# Patient Record
Sex: FEMALE | Race: WHITE | Employment: OTHER | URBAN - METROPOLITAN AREA
[De-identification: names, ages, dates, MRNs, and addresses within clinical notes are randomized per-mention and may not be internally consistent; named-entity substitution may affect disease eponyms.]

---

## 2018-12-17 ENCOUNTER — HOSPITAL ENCOUNTER (OUTPATIENT)
Age: 83
Discharge: HOME | End: 2018-12-17
Payer: MEDICARE

## 2018-12-17 ENCOUNTER — HOSPITAL ENCOUNTER (OUTPATIENT)
Age: 83
Discharge: HOME | End: 2018-12-19
Payer: MEDICARE

## 2018-12-17 LAB
ABO/RH: NORMAL
ANION GAP SERPL CALCULATED.3IONS-SCNC: 13 MMOL/L (ref 7–16)
ANTIBODY SCREEN: NORMAL
APTT: 39 SEC (ref 24.5–35.1)
BILIRUBIN URINE: NEGATIVE
BLOOD, URINE: NEGATIVE
BUN BLDV-MCNC: 16 MG/DL (ref 8–23)
CALCIUM SERPL-MCNC: 9.6 MG/DL (ref 8.6–10.2)
CHLORIDE BLD-SCNC: 101 MMOL/L (ref 98–107)
CLARITY: CLEAR
CO2: 26 MMOL/L (ref 22–29)
COLOR: YELLOW
CREAT SERPL-MCNC: 1.1 MG/DL (ref 0.5–1)
GFR AFRICAN AMERICAN: 57
GFR NON-AFRICAN AMERICAN: 47 ML/MIN/1.73
GLUCOSE BLD-MCNC: 103 MG/DL (ref 74–99)
GLUCOSE URINE: NEGATIVE MG/DL
HCT VFR BLD CALC: 40.5 % (ref 34–48)
HEMOGLOBIN: 12.7 G/DL (ref 11.5–15.5)
INR BLD: 1.2
KETONES, URINE: NEGATIVE MG/DL
LEUKOCYTE ESTERASE, URINE: NEGATIVE
MCH RBC QN AUTO: 27.9 PG (ref 26–35)
MCHC RBC AUTO-ENTMCNC: 31.4 % (ref 32–34.5)
MCV RBC AUTO: 89 FL (ref 80–99.9)
NITRITE, URINE: NEGATIVE
PDW BLD-RTO: 14 FL (ref 11.5–15)
PH UA: 7 (ref 5–9)
PLATELET # BLD: 233 E9/L (ref 130–450)
PMV BLD AUTO: 11.7 FL (ref 7–12)
POTASSIUM SERPL-SCNC: 4.7 MMOL/L (ref 3.5–5)
PROTEIN UA: NEGATIVE MG/DL
PROTHROMBIN TIME: 13.5 SEC (ref 9.3–12.4)
RBC # BLD: 4.55 E12/L (ref 3.5–5.5)
SODIUM BLD-SCNC: 140 MMOL/L (ref 132–146)
SPECIFIC GRAVITY UA: <=1.005 (ref 1–1.03)
UROBILINOGEN, URINE: 0.2 E.U./DL
WBC # BLD: 5.5 E9/L (ref 4.5–11.5)

## 2018-12-17 PROCEDURE — 85730 THROMBOPLASTIN TIME PARTIAL: CPT

## 2018-12-17 PROCEDURE — 85610 PROTHROMBIN TIME: CPT

## 2018-12-17 PROCEDURE — 85027 COMPLETE CBC AUTOMATED: CPT

## 2018-12-17 PROCEDURE — 86850 RBC ANTIBODY SCREEN: CPT

## 2018-12-17 PROCEDURE — 81003 URINALYSIS AUTO W/O SCOPE: CPT

## 2018-12-17 PROCEDURE — 80048 BASIC METABOLIC PNL TOTAL CA: CPT

## 2018-12-17 PROCEDURE — 36415 COLL VENOUS BLD VENIPUNCTURE: CPT

## 2018-12-17 PROCEDURE — 86901 BLOOD TYPING SEROLOGIC RH(D): CPT

## 2018-12-17 PROCEDURE — 86900 BLOOD TYPING SEROLOGIC ABO: CPT

## 2018-12-19 ENCOUNTER — HOSPITAL ENCOUNTER (OUTPATIENT)
Dept: CARDIAC CATH/INVASIVE PROCEDURES | Age: 83
Discharge: HOME | End: 2018-12-19
Attending: INTERNAL MEDICINE | Admitting: INTERNAL MEDICINE
Payer: MEDICARE

## 2018-12-19 VITALS
RESPIRATION RATE: 19 BRPM | HEIGHT: 63 IN | WEIGHT: 189 LBS | TEMPERATURE: 97.9 F | SYSTOLIC BLOOD PRESSURE: 154 MMHG | HEART RATE: 73 BPM | BODY MASS INDEX: 33.49 KG/M2 | OXYGEN SATURATION: 96 % | DIASTOLIC BLOOD PRESSURE: 82 MMHG

## 2018-12-19 DIAGNOSIS — R07.9 CHEST PAIN, UNSPECIFIED TYPE: ICD-10-CM

## 2018-12-19 PROCEDURE — 6360000002 HC RX W HCPCS

## 2018-12-19 PROCEDURE — 2500000003 HC RX 250 WO HCPCS

## 2018-12-19 PROCEDURE — C1894 INTRO/SHEATH, NON-LASER: HCPCS

## 2018-12-19 PROCEDURE — C1760 CLOSURE DEV, VASC: HCPCS

## 2018-12-19 PROCEDURE — C1769 GUIDE WIRE: HCPCS

## 2018-12-19 PROCEDURE — 2709999900 HC NON-CHARGEABLE SUPPLY

## 2018-12-19 PROCEDURE — 2580000003 HC RX 258: Performed by: INTERNAL MEDICINE

## 2018-12-19 PROCEDURE — 93454 CORONARY ARTERY ANGIO S&I: CPT | Performed by: INTERNAL MEDICINE

## 2018-12-19 RX ORDER — ACETAMINOPHEN 325 MG/1
650 TABLET ORAL EVERY 4 HOURS PRN
Status: CANCELLED | OUTPATIENT
Start: 2018-12-19

## 2018-12-19 RX ORDER — SODIUM CHLORIDE 0.9 % (FLUSH) 0.9 %
10 SYRINGE (ML) INJECTION PRN
Status: CANCELLED | OUTPATIENT
Start: 2018-12-19

## 2018-12-19 RX ORDER — SODIUM CHLORIDE 9 MG/ML
INJECTION, SOLUTION INTRAVENOUS ONCE
Status: COMPLETED | OUTPATIENT
Start: 2018-12-19 | End: 2018-12-19

## 2018-12-19 RX ORDER — APIXABAN 5 MG/1
5 TABLET, FILM COATED ORAL 2 TIMES DAILY
Qty: 60 TABLET | Refills: 1 | Status: SHIPPED | OUTPATIENT
Start: 2018-12-22 | End: 2020-02-26 | Stop reason: SDUPTHER

## 2018-12-19 RX ORDER — SODIUM CHLORIDE 0.9 % (FLUSH) 0.9 %
10 SYRINGE (ML) INJECTION EVERY 12 HOURS SCHEDULED
Status: CANCELLED | OUTPATIENT
Start: 2018-12-19

## 2018-12-19 RX ORDER — LANOLIN ALCOHOL/MO/W.PET/CERES
3 CREAM (GRAM) TOPICAL NIGHTLY PRN
COMMUNITY
End: 2021-04-19

## 2018-12-19 RX ADMIN — SODIUM CHLORIDE: 9 INJECTION, SOLUTION INTRAVENOUS at 12:50

## 2018-12-19 RX ADMIN — SODIUM CHLORIDE: 9 INJECTION, SOLUTION INTRAVENOUS at 12:55

## 2018-12-19 ASSESSMENT — PAIN SCALES - GENERAL
PAINLEVEL_OUTOF10: 0
PAINLEVEL_OUTOF10: 0

## 2018-12-19 NOTE — H&P
Department of Medicine  Section of Cardiology  Attending Procedure History and Physical        Pre-Procedural Diagnosis:  Dyspnea on exertion, abnormal stress test    Indications:  Dyspnea on exertion, abnormal stress test    Procedure Planned:  Cardiac catheterization    History obtained from patient    History of Present Illness: The patient is a 80 y.o. female who presents for the above procedure. The patient is an 80years old female has been having dyspnea on exertion, and abdomen stress test showing inferior lateral ischemia, patient was brought to the Cath Lab to evaluate the anatomy the coronary arteries.     Past Medical History:        Diagnosis Date    SONY (acute kidney injury) ([address] 11/26/2016    Arthritis     back    Atrial fibrillation (HCC)     CKD (chronic kidney disease) stage 3, GFR 30-59 ml/min 11/26/2016    History of blood transfusion     Hypertension     Murmur, heart     innocent  no symptoms    PONV (postoperative nausea and vomiting)     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 (HCC)     Thyroid disease        Past Surgical History:        Procedure Laterality Date    APPENDECTOMY      BACK SURGERY      lumbar fusion    CATARACT REMOVAL WITH IMPLANT  09-25-12    right eye    CATARACT REMOVAL WITH IMPLANT Left 09/17/13    CHOLECYSTECTOMY      COLONOSCOPY      FOOT SURGERY      bilateral    HYSTERECTOMY      OTHER SURGICAL HISTORY Right 10/07/2015    rijt total knee arthroplasy    TOTAL KNEE ARTHROPLASTY Left February 23, 2015     Medications:    Current Outpatient Rx   Medication Sig Dispense Refill    melatonin 3 MG TABS tablet Take 3 mg by mouth nightly as needed      [START ON 12/22/2018] ELIQUIS 5 MG TABS tablet Take 1 tablet by mouth 2 times daily 60 tablet 1    HYDROcodone-acetaminophen (NORCO)  MG per tablet Take 1 tablet by mouth every 6 hours as needed for Pain 60 tablet 0    magnesium oxide (MAG-OX) 400 (240 MG) MG tablet Take 0.5 tablets by mouth daily 30 tablet     levothyroxine (SYNTHROID) 88 MCG tablet Take 1 tablet by mouth Daily 30 tablet 3    metoprolol succinate (TOPROL XL) 25 MG extended release tablet Take 1 tablet by mouth daily 30 tablet 3    ferrous sulfate 325 (65 FE) MG tablet Take 325 mg by mouth daily (with breakfast)      acetaminophen 650 MG TABS Take 650 mg by mouth every 4 hours as needed for Fever (For mild pain or temperature above 102 degrees Fahrenheit) 120 tablet 3    Carboxymethylcellulose Sodium (REFRESH TEARS) 0.5 % SOLN Apply 1 drop to eye daily as needed (for dry eye).  Multiple Vitamins-Minerals (THERAPEUTIC MULTIVITAMIN-MINERALS) tablet Take 1 tablet by mouth daily.  memantine (NAMENDA) 5 MG tablet Take 1 tablet by mouth daily 60 tablet 3    atorvastatin (LIPITOR) 40 MG tablet Take 1 tablet by mouth nightly 30 tablet 3    fluticasone (FLONASE) 50 MCG/ACT nasal spray 2 sprays by Nasal route daily. 0    Polyethylene Glycol 3350 (MIRALAX PO) Take  by mouth daily as needed. Allergies:  Penicillins; Codeine; Cytotec [misoprostol];  Entex [ami-janet]; and Penicillin g    History of allergic reaction to anesthesia:  no    Social History  No smoking    REVIEW OF SYSTEMS  ROS:  CONSTITUTIONAL:  negative for  fevers, chills  HEENT:  negative for earaches, nasal congestion and epistaxis  RESPIRATORY:  negative for  dry cough, cough with sputum,wheezing and hemoptysis  GASTROINTESTINAL:  negative for nausea, vomiting  MUSCULOSKELETAL:  negative for  myalgias, arthralgias  NEUROLOGICAL:  negative for visual disturbance, dysphagia    PHYSICAL EXAM    BP (!) 142/105   Pulse 79   Temp 96.2 °F (35.7 °C) (Temporal)   Resp 20   Ht 5' 3\" (1.6 m)   Wt 189 lb (85.7 kg)   SpO2 96%   BMI 33.48 kg/m²   CONSTITUTIONAL:  awake, alert, cooperative, no apparent distress, and appears stated age  HEAD:  normocepalic, without obvious abnormality, atraumatic  NECK:  Supple, symmetrical, trachea

## 2018-12-19 NOTE — OP NOTE
Indication:  1. Dyspnea on exertion, abnormal stress test  2. AUC score 7  3. AUC indication :  4    Procedure: coronary angiography  Anesthesia: Versed, Fentanyl  Time sedation was administered: 1621. I was present in the room when sedation was administered. Procedure end time: 3341  Time spent with face to face monitoring of moderate sedation: 23 minutes      Findings:  Left main: 0 % stenosis  LAD: 30-40% proximal stenosis, mild ectasia in the mid segment, followed by 40-50% focal disease  Circumflex: luminal irregularities  RCA: Dominant. Luminal irregularities    Hemodynamics:   Ao: 694/99 mmHg      Complication: None   Blood loss: Minimal  Contrast used: 75 mL  Total fluoroscopic time 2.1 minutes    Post Op diagnosis:  40-50% LAD disease    PLAN:  Medical therapy

## 2018-12-20 NOTE — PROCEDURES
It is ectatic in the mid  segment. There is 40% to 50% disease distal to the second diagonal  branch. There is another 30% to 40% disease prior to that ectatic area. The rest of the vessel has luminal irregularities. The left circumflex artery is a small to mid-sized vessel without any  significant disease. The right coronary artery arises normally from the right sinus of  Valsalva. It is a large vessel, dominant circulation without any  significant disease. At the end of the procedure, the catheter and the wire were pulled out  from the body. Limited right iliofemoral angiography confirmed the  arteriotomy was in the right common femoral artery. So, a 6-Hungarian  Angio-Seal was deployed in the right common femoral artery with  effective hemostasis. COMPLICATIONS:  None. MEDICATIONS USED DURING THE PROCEDURE:  Versed and fentanyl for  conscious sedation. First dose was given at [phone]. Procedure ended at  [phone]. There were 23 minutes of direct face-to-face supervision during  conscious sedation administration. Total contrast used was 75 mL. Total fluoroscopy time was 1.7 minutes. IMPRESSION:  1.  50% disease involving the mid segment of the LAD, 40% involving the  proximal segment, mildly ectatic proximal to mid segment of the LAD. Mild disease involving the first diagonal branch. 2.  Luminal irregularities involving the left circumflex artery. 3.  Luminal irregularities involving a very large dominant right  coronary artery. RECOMMENDATIONS:  1. Continue aggressive coronary artery disease risk factor  modifications. 2.  The patient will be observed for four hours, then discharged home if  she meets discharge criteria.         Shena Barakat MD    D: 12/19/2018 17:16:19       T: 12/19/2018 18:47:50     [email or web address]  Job#: [id number]     Doc#: [id number]    CC:  Amaris Cottrell MD

## 2019-03-18 ENCOUNTER — PREP FOR PROCEDURE (OUTPATIENT)
Dept: PAIN MANAGEMENT | Age: 84
End: 2019-03-18

## 2020-02-11 VITALS
SYSTOLIC BLOOD PRESSURE: 148 MMHG | DIASTOLIC BLOOD PRESSURE: 68 MMHG | BODY MASS INDEX: 34.09 KG/M2 | WEIGHT: 192.38 LBS | HEIGHT: 63 IN | HEART RATE: 68 BPM

## 2020-02-11 RX ORDER — VALSARTAN 40 MG/1
40 TABLET ORAL DAILY
COMMUNITY
End: 2020-02-26 | Stop reason: SDUPTHER

## 2020-02-11 RX ORDER — CARVEDILOL 12.5 MG/1
12.5 TABLET ORAL EVERY MORNING
COMMUNITY
End: 2020-09-08 | Stop reason: SDUPTHER

## 2020-02-26 RX ORDER — VALSARTAN 40 MG/1
40 TABLET ORAL DAILY
Qty: 90 TABLET | Refills: 1 | Status: SHIPPED
Start: 2020-02-26 | End: 2020-02-27 | Stop reason: SDUPTHER

## 2020-02-26 RX ORDER — APIXABAN 5 MG/1
5 TABLET, FILM COATED ORAL 2 TIMES DAILY
Qty: 180 TABLET | Refills: 1 | Status: SHIPPED
Start: 2020-02-26 | End: 2020-10-09 | Stop reason: SDUPTHER

## 2020-02-27 RX ORDER — VALSARTAN 40 MG/1
TABLET ORAL
Qty: 180 TABLET | Refills: 1 | Status: SHIPPED
Start: 2020-02-27 | End: 2020-10-09

## 2020-04-14 ENCOUNTER — TELEPHONE (OUTPATIENT)
Dept: CARDIOLOGY CLINIC | Age: 85
End: 2020-04-14

## 2020-08-05 ENCOUNTER — TELEPHONE (OUTPATIENT)
Dept: ADMINISTRATIVE | Age: 85
End: 2020-08-05

## 2020-08-05 NOTE — TELEPHONE ENCOUNTER
Pt called to schedule appt w/ Dr Bob Church, 's schedule is blocked, please call pt at [phone] to schedule her

## 2020-09-08 RX ORDER — CARVEDILOL 12.5 MG/1
12.5 TABLET ORAL EVERY MORNING
Qty: 60 TABLET | Refills: 5 | Status: SHIPPED
Start: 2020-09-08 | End: 2020-10-09 | Stop reason: DRUGHIGH

## 2020-09-08 RX ORDER — CARVEDILOL 12.5 MG/1
12.5 TABLET ORAL EVERY MORNING
Qty: 30 TABLET | Refills: 1 | Status: SHIPPED | OUTPATIENT
Start: 2020-09-08 | End: 2020-09-08

## 2020-10-08 NOTE — PROGRESS NOTES
[facility] Cardiology Progress Note  Dr. Cintia Marques      Referring Physician: Julián Linda MD  CHIEF COMPLAINT:   Chief Complaint   Patient presents with    Atrial Fibrillation     6month check . SOB on exertion. Patient denies any cp or dizziness. HISTORY OF PRESENT ILLNESS:   Patient is 80year old lady with CAD, atrial fibrillation, right MCA stroke, is here for follow up . Shortness of breath is at baseline, Patient denies any lightheadedness, no dizziness, no palpitations, no pedal edema, no PND, no orthopnea, no syncope, no presyncopal episodes.   Functional capacity is at baseline    Past Medical History:   Diagnosis Date    S[address]Y (acute kidney injury) ([facility] [address] 11/26/2016    Arthritis     back    Atrial fibrillation (HCC)     Chronic atrial fibrillation (HCC)     CKD (chronic kidney disease) stage 3, GFR 30-59 ml/min 11/26/2016    History of blood transfusion     Hypertension     Hypothyroidism, unspecified     Long term (current) use of anticoagulants     Murmur, heart     innocent  no symptoms    PONV (postoperative nausea and vomiting)     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 (HCC)     Thyroid disease          Past Surgical History:   Procedure Laterality Date    APPENDECTOMY      BACK SURGERY      lumbar fusion    CATARACT REMOVAL WITH IMPLANT  09-25-12    right eye    CATARACT REMOVAL WITH IMPLANT Left 09/17/13    CHOLECYSTECTOMY      COLONOSCOPY      FOOT SURGERY      bilateral    HYSTERECTOMY      [facility]    TOTAL KNEE ARTHROPLASTY Left February 23, 2015         Current Outpatient Medications   Medication Sig Dispense Refill    carvedilol (COREG) 6.25 MG tablet Take 1 tablet by mouth 2 times daily 180 tablet 2    levothyroxine (SYNTHROID) 88 MCG tablet Take 1 tablet by mouth Daily 30 tablet 3    spironolactone (ALDACTONE) 50 MG tablet Take 1 tablet by mouth daily 90 tablet 1    melatonin 3 MG TABS tablet Take 3 mg by mouth nightly as needed      magnesium oxide (MAG-OX) 400 (240 MG) MG tablet Take 0.5 tablets by mouth daily 30 tablet     acetaminophen 650 MG TABS Take 650 mg by mouth every 4 hours as needed for Fever (For mild pain or temperature above 102 degrees Fahrenheit) 120 tablet 3    Carboxymethylcellulose Sodium (REFRESH TEARS) 0.5 % SOLN Apply 1 drop to eye daily as needed (for dry eye).  Multiple Vitamins-Minerals (THERAPEUTIC MULTIVITAMIN-MINERALS) tablet Take 1 tablet by mouth daily.  Polyethylene Glycol 3350 (MIRALAX PO) Take  by mouth daily as needed.  ELIQUIS 5 MG TABS tablet TAKE 1 TABLET BY MOUTH TWO  TIMES DAILY 180 tablet 3     No current facility-administered medications for this visit. Allergies as of 10/09/2020 - Review Complete 10/09/2020   Allergen Reaction Noted    Cephalexin hcl [cephalexin] Other (See Comments) 02/11/2020    Penicillins  06/08/2012    Codeine Itching 06/08/2012    Cytotec [misoprostol]  09/20/2012    Entex [ami-janet]  09/20/2012    Morphine Hives 02/11/2020    Penicillin g  01/27/2015       Social History     Socioeconomic History    Marital status:      Spouse name: Not on file    Number of children: Not on file    Years of education: Not on file    Highest education level: Not on file   Occupational History    Not on file   Social Needs    Financial resource strain: Not on file    Food insecurity     Worry: Not on file     Inability: Not on file    Transportation needs     Medical: Not on file     Non-medical: Not on file   Tobacco Use    Smoking status: Never Smoker    Smokeless tobacco: Never Used   Substance and Sexual Activity    Alcohol use: Yes     Alcohol/week: 1.0 standard drinks     Types: 1 Glasses of wine per week     Comment: twice a week.  2 cup of coffee a day     Drug use: No    Sexual activity: Not Currently   Lifestyle    Physical activity     Days per week: Not on file     Minutes per session: Not on file    Stress: Not on file   Relationships    Social connections     Talks on phone: Not on file     Gets together: Not on file     Attends Yarsani service: Not on file     Active member of club or organization: Not on file     Attends meetings of clubs or organizations: Not on file     Relationship status: Not on file    Intimate partner violence     Fear of current or ex partner: Not on file     Emotionally abused: Not on file     Physically abused: Not on file     Forced sexual activity: Not on file   Other Topics Concern    Not on file   Social History Narrative    Not on file       Family History   Problem Relation Age of Onset    Heart Disease Mother     Arthritis Mother     Heart Disease Father        REVIEW OF SYSTEMS:     CONSTITUTIONAL:  negative for  fevers, chills, sweats and fatigue  HEENT:  negative for  tinnitus, earaches, nasal congestion and epistaxis  RESPIRATORY:  negative for  dry cough, cough with sputum, dyspnea, wheezing and hemoptysis  GASTROINTESTINAL:  negative for nausea, vomiting, diarrhea, constipation, pruritus and jaundice  HEMATOLOGIC/LYMPHATIC:  negative for easy bruising, bleeding, lymphadenopathy and petechiae  ENDOCRINE:  negative for heat intolerance, cold intolerance, tremor, hair loss and diabetic symptoms including neither polyuria nor polydipsia nor blurred vision  MUSCULOSKELETAL:  negative for  myalgias, arthralgias, joint swelling, stiff joints and decreased range of motion  NEUROLOGICAL:  negative for memory problems, speech problems, visual disturbance, dysphagia, weakness and numbness      PHYSICAL EXAM:   Constitutional:  Awake, alert cooperative, no apparent distress, and appears stated age. HEENT:  Moist and pink mucous membranes, normocephalic, without obvious abnormality, atraumatic, normal ears and nose.   Neck:  Supple, symmetrical, trachea midline, no JVD, no adenopathy, thyroid symmetric, not enlarged and no tenderness, good carotid upstroke bilaterally, no carotid bruit, skin normal  Lungs: No increased work of breathing, good air exchange, clear to auscultation bilaterally, no crackles or wheezing. Cardiovascular: Normal apical impulse, irregularly irregular, normal S1 and S2, no S3 or S4, 3/6 systolic murmur at the apex, 3/6 systolic murmur at the left lower sternal border, no pedal edema, good carotid upstroke bilaterally, no carotid bruit, no JVD, no abdominal pulsating masses. Abdomen: Soft, nontender, no hepatomegaly, no splenomegaly, bowel sound positive. CHEST:  Expands symmetrically, nontender to palpation. Musculoskeletal:  No clubbing or cyanosis. No redness, warmth, or swelling of the joints. Neurological: Alert, awake, and oriented X3    /76 (Site: Right Upper Arm, Position: Sitting, Cuff Size: Large Adult)   Pulse 72   Ht 5' 3\" (1.6 m)   Wt 198 lb (89.8 kg)   BMI 35.07 kg/m²     DATA:   I personally reviewed the visit EKG with the following interpretation: Atrial fibrillation, controlled ventricular response    EKG - (9/5/2019) I personally reviewed the EKG with the following interpretation: Atrial fibrillation, controlled ventricular response, nonspecific T-wave changes    ECHO: 11/26/2016) Proximal septal thickening. NOrmal LV segmental wall motion. Estimated left ventricular ejection fraction is 55%. Diastolic function normal as LAESV Index is <29 ml/m2. Normal right ventricular size and function. Physiologic and/or trace mitral regurgitation is present. Trace aortic regurgitation is noted. Physiologic and/or trace tricuspid regurgitation. RVSP is 24 mmHg. Technically fair quality study. Stress Test: 11/9/2018) 1. Negative Lexiscan stress test for ischemic symptoms or ischemic EKG changes. 2. Abnormal Cardiolite perfusion scan showing intermediate size, moderate in severity reversible defect involving the lateral wall, mild, large reversible defect involving the inferior wall.   3. Normal left ventricular systolic function with normal wall motion. 4. Comparing this study to a study done in 2008, the lateral and inferior wall defect are new findings. 5. The results of the study predict intermediate to high probability for significant coronary artery disease or future cardiac events. Abnormal study. Angiography: (12/19/2018) 1. 50% disease involving the mid segment of the LAD, 40% involving the proximal segment, mildly ectatic proximal to mid segment of the LAD. Mild disease involving the first diagonal branch. 2. Luminal irregularities involving the left circumflex artery. 3. Luminal irregularities involving a very large dominant right coronary artery.   Cardiology Labs: BMP:    Lab Results   Component Value Date     12/17/2018    K 4.7 12/17/2018     12/17/2018    CO2 26 12/17/2018    BUN 16 12/17/2018    CREATININE 1.1 12/17/2018     CMP:    Lab Results   Component Value Date     12/17/2018    K 4.7 12/17/2018     12/17/2018    CO2 26 12/17/2018    BUN 16 12/17/2018    CREATININE 1.1 12/17/2018    PROT 7.4 10/21/2015     CBC:    Lab Results   Component Value Date    WBC 5.5 12/17/2018    RBC 4.55 12/17/2018    HGB 12.7 12/17/2018    HCT 40.5 12/17/2018    MCV 89.0 12/17/2018    RDW 14.0 12/17/2018     12/17/2018     PT/INR:  No results found for: PTINR  PT/INR Warfarin:  No components found for: PTPATWAR, PTINRWAR  PTT:    Lab Results   Component Value Date    APTT 39.0 12/17/2018     PTT Heparin:  No components found for: APTTHEP  Magnesium:    Lab Results   Component Value Date    MG 2.2 10/20/2015     TSH:    Lab Results   Component Value Date    TSH 4.310 11/27/2016     TROPONIN:  No components found for: TROP  BNP:  No results found for: BNP  FASTING LIPID PANEL:    Lab Results   Component Value Date    CHOL 148 11/28/2016    HDL 54 11/28/2016    TRIG 92 11/28/2016     No orders to display     I have personally reviewed the laboratory, cardiac diagnostic and radiographic testing as outlined above:      IMPRESSION:  1: Chronic atrial fibrillation: Controlled ventricular response, will continue current medications, will continue chronic anticoagulation wit Eliquis           2:  Atherosclerotic heart disease of native coronary artery with    :  Had cardiac catheterization on (12/19/2018) with the following findings:  #50% disease involving the mid segment of the LAD, 40% involving the proximal segment, mildly ectatic proximal to mid segment of the LAD. Mild disease involving the first diagonal branch. #Luminal irregularities involving the left circumflex artery. #Luminal irregularities involving a very large dominant right coronary artery. Continue current treatment               3: Essential (primary) hypertension: Controlled, continue current treatment. 4:  Cerebral infarction due to unspecified occlusion or stenosis                5: Long term (current) use of anticoagulants                RECOMMENDATIONS:   1. Continue current treatment  2. Increase risk of bleeding due to being on anti-coagulation, symptoms and signs of bleeding discussed with patient, patient was advised to seek medical attention at the earliest symptoms or signs of bleeding. 3.  Follow-up with Dr. Ziggy Bo as scheduled  4. Follow-up with Dr. Debbie Madrid in 6 months, sooner if symptomatic for any reason    I have reviewed my findings and recommendations with patient and her daughter at bedside    Electronically signed by Wisam Becerril MD on 10/25/2020 at 6:46 PM    NOTE: This report was transcribed using voice recognition software.  Every effort was made to ensure accuracy; however, inadvertent computerized transcription errors may be present

## 2020-10-09 ENCOUNTER — OFFICE VISIT (OUTPATIENT)
Dept: CARDIOLOGY CLINIC | Age: 85
End: 2020-10-09
Payer: MEDICARE

## 2020-10-09 VITALS
HEART RATE: 72 BPM | DIASTOLIC BLOOD PRESSURE: 76 MMHG | HEIGHT: 63 IN | SYSTOLIC BLOOD PRESSURE: 118 MMHG | BODY MASS INDEX: 35.08 KG/M2 | WEIGHT: 198 LBS

## 2020-10-09 PROCEDURE — 93000 ELECTROCARDIOGRAM COMPLETE: CPT | Performed by: INTERNAL MEDICINE

## 2020-10-09 PROCEDURE — 99214 OFFICE O/P EST MOD 30 MIN: CPT | Performed by: INTERNAL MEDICINE

## 2020-10-09 RX ORDER — CARVEDILOL 6.25 MG/1
6.25 TABLET ORAL 2 TIMES DAILY
Qty: 180 TABLET | Refills: 2 | Status: SHIPPED
Start: 2020-10-09 | End: 2021-02-24 | Stop reason: SDUPTHER

## 2020-10-09 RX ORDER — SPIRONOLACTONE 50 MG/1
50 TABLET, FILM COATED ORAL DAILY
Qty: 90 TABLET | Refills: 1 | Status: SHIPPED
Start: 2020-10-09 | End: 2021-08-20

## 2020-10-09 RX ORDER — APIXABAN 5 MG/1
5 TABLET, FILM COATED ORAL 2 TIMES DAILY
Qty: 56 TABLET | Refills: 0 | COMMUNITY
Start: 2020-10-09 | End: 2020-10-12

## 2020-10-09 RX ORDER — LEVOTHYROXINE SODIUM 88 UG/1
88 TABLET ORAL DAILY
Qty: 30 TABLET | Refills: 3 | Status: SHIPPED | OUTPATIENT
Start: 2020-10-09

## 2020-10-09 RX ORDER — APIXABAN 5 MG/1
5 TABLET, FILM COATED ORAL 2 TIMES DAILY
Qty: 180 TABLET | Refills: 2 | Status: SHIPPED
Start: 2020-10-09 | End: 2020-10-09 | Stop reason: SDUPTHER

## 2020-10-12 RX ORDER — APIXABAN 5 MG/1
TABLET, FILM COATED ORAL
Qty: 180 TABLET | Refills: 3 | Status: SHIPPED
Start: 2020-10-12 | End: 2021-04-19 | Stop reason: SDUPTHER

## 2021-02-24 RX ORDER — CARVEDILOL 12.5 MG/1
TABLET ORAL
Qty: 60 TABLET | Refills: 3 | Status: SHIPPED
Start: 2021-02-24 | End: 2021-04-19

## 2021-02-24 RX ORDER — CARVEDILOL 6.25 MG/1
6.25 TABLET ORAL 2 TIMES DAILY
Qty: 180 TABLET | Refills: 2 | Status: SHIPPED
Start: 2021-02-24 | End: 2021-11-11

## 2021-04-19 ENCOUNTER — OFFICE VISIT (OUTPATIENT)
Dept: CARDIOLOGY CLINIC | Age: 86
End: 2021-04-19
Payer: MEDICARE

## 2021-04-19 VITALS
WEIGHT: 196 LBS | DIASTOLIC BLOOD PRESSURE: 70 MMHG | SYSTOLIC BLOOD PRESSURE: 124 MMHG | HEIGHT: 63 IN | BODY MASS INDEX: 34.73 KG/M2 | HEART RATE: 87 BPM

## 2021-04-19 DIAGNOSIS — I48.21 PERMANENT ATRIAL FIBRILLATION (HCC): Primary | ICD-10-CM

## 2021-04-19 PROCEDURE — 99214 OFFICE O/P EST MOD 30 MIN: CPT | Performed by: INTERNAL MEDICINE

## 2021-04-19 PROCEDURE — G8417 CALC BMI ABV UP PARAM F/U: HCPCS | Performed by: INTERNAL MEDICINE

## 2021-04-19 PROCEDURE — 93000 ELECTROCARDIOGRAM COMPLETE: CPT | Performed by: INTERNAL MEDICINE

## 2021-04-19 PROCEDURE — 1090F PRES/ABSN URINE INCON ASSESS: CPT | Performed by: INTERNAL MEDICINE

## 2021-04-19 PROCEDURE — 1036F TOBACCO NON-USER: CPT | Performed by: INTERNAL MEDICINE

## 2021-04-19 PROCEDURE — 1123F ACP DISCUSS/DSCN MKR DOCD: CPT | Performed by: INTERNAL MEDICINE

## 2021-04-19 PROCEDURE — G8427 DOCREV CUR MEDS BY ELIG CLIN: HCPCS | Performed by: INTERNAL MEDICINE

## 2021-04-19 PROCEDURE — 4040F PNEUMOC VAC/ADMIN/RCVD: CPT | Performed by: INTERNAL MEDICINE

## 2021-04-19 RX ORDER — PAROXETINE 10 MG/1
TABLET, FILM COATED ORAL
COMMUNITY
Start: 2021-03-25

## 2021-04-19 RX ORDER — APIXABAN 5 MG/1
TABLET, FILM COATED ORAL
Qty: 42 TABLET | Refills: 0 | COMMUNITY
Start: 2021-04-19 | End: 2021-05-20 | Stop reason: SDUPTHER

## 2021-04-19 NOTE — PROGRESS NOTES
[facility] Cardiology Progress Note  Dr. Dallas Kelly      Referring Physician: Jose Ma MD  CHIEF COMPLAINT:   Chief Complaint   Patient presents with    Atrial Fibrillation     6 month check . Patient denies any cp sob or dizziness. HISTORY OF PRESENT ILLNESS:   Patient is 80year old lady with CAD, atrial fibrillation, right MCA stroke, is here for follow up . Shortness of breath is at baseline, Patient denies any lightheadedness, no dizziness, no palpitations, no pedal edema, no PND, no orthopnea, no syncope, no presyncopal episodes.     Functional capacity is at baseline      Past Medical History:   Diagnosis Date    SONY (acute kidney injury) ([facility] [address] 11/26/2016    Arthritis     back    Atrial fibrillation (HCC)     Chronic atrial fibrillation (HCC)     CKD (chronic kidney disease) stage 3, GFR 30-59 ml/min 11/26/2016    History of blood transfusion     Hypertension     Hypothyroidism, unspecified     Long term (current) use of anticoagulants     Murmur, heart     innocent  no symptoms    PONV (postoperative nausea and vomiting)     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 (HCC)     Thyroid disease          Past Surgical History:   Procedure Laterality Date    APPENDECTOMY      BACK SURGERY      lumbar fusion    CATARACT REMOVAL WITH IMPLANT  09-25-12    right eye    CATARACT REMOVAL WITH IMPLANT Left 09/17/13    CHOLECYSTECTOMY      COLONOSCOPY      FOOT SURGERY      bilateral    HYSTERECTOMY      OTHER SURGICAL HISTORY Right 10/07/2015    [facility]    TOTAL KNEE ARTHROPLASTY Left February 23, 2015         Current Outpatient Medications   Medication Sig Dispense Refill    PARoxetine (PAXIL) 10 MG tablet       carvedilol (COREG) 6.25 MG tablet Take 1 tablet by mouth 2 times daily 180 tablet 2    levothyroxine (SYNTHROID) 88 MCG tablet Take 1 tablet by mouth Daily 30 tablet 3    spironolactone (ALDACTONE) 50 MG tablet Take 1 tablet by mouth daily 90 tablet 1    acetaminophen 650 MG TABS Take 650 mg by mouth every 4 hours as needed for Fever (For mild pain or temperature above 102 degrees Fahrenheit) 120 tablet 3    Carboxymethylcellulose Sodium (REFRESH TEARS) 0.5 % SOLN Apply 1 drop to eye daily as needed (for dry eye).  Multiple Vitamins-Minerals (THERAPEUTIC MULTIVITAMIN-MINERALS) tablet Take 1 tablet by mouth daily.  ELIQUIS 5 MG TABS tablet TAKE 1 TABLET BY MOUTH TWO  TIMES DAILY 42 tablet 0     No current facility-administered medications for this visit. Allergies as of 04/19/2021 - Review Complete 04/19/2021   Allergen Reaction Noted    Cephalexin hcl [cephalexin] Other (See Comments) 02/11/2020    Penicillins  06/08/2012    Codeine Itching 06/08/2012    Cytotec [misoprostol]  09/20/2012    Entex [ami-janet]  09/20/2012    Morphine Hives 02/11/2020    Penicillin g  01/27/2015       Social History     Socioeconomic History    Marital status:      Spouse name: Not on file    Number of children: Not on file    Years of education: Not on file    Highest education level: Not on file   Occupational History    Not on file   Social Needs    Financial resource strain: Not on file    Food insecurity     Worry: Not on file     Inability: Not on file    Transportation needs     Medical: Not on file     Non-medical: Not on file   Tobacco Use    Smoking status: Never Smoker    Smokeless tobacco: Never Used   Substance and Sexual Activity    Alcohol use: Yes     Alcohol/week: 1.0 standard drinks     Types: 1 Glasses of wine per week     Comment: twice a week.  2 cup of coffee a day     Drug use: No    Sexual activity: Not Currently   Lifestyle    Physical activity     Days per week: Not on file     Minutes per session: Not on file    Stress: Not on file   Relationships    Social connections     Talks on phone: Not on file     Gets together: Not on file     Attends Nondenominational service: Not on file     Active member of club or organization: Not on file     Attends meetings of clubs or organizations: Not on file     Relationship status: Not on file    Intimate partner violence     Fear of current or ex partner: Not on file     Emotionally abused: Not on file     Physically abused: Not on file     Forced sexual activity: Not on file   Other Topics Concern    Not on file   Social History Narrative    Not on file       Family History   Problem Relation Age of Onset    Heart Disease Mother     Arthritis Mother     Heart Disease Father        REVIEW OF SYSTEMS:     CONSTITUTIONAL:  negative for  fevers, chills, sweats and fatigue  HEENT:  negative for  tinnitus, earaches, nasal congestion and epistaxis  RESPIRATORY:  negative for  dry cough, cough with sputum, dyspnea, wheezing and hemoptysis  GASTROINTESTINAL:  negative for nausea, vomiting, diarrhea, constipation, pruritus and jaundice  HEMATOLOGIC/LYMPHATIC:  negative for easy bruising, bleeding, lymphadenopathy and petechiae  ENDOCRINE:  negative for heat intolerance, cold intolerance, tremor, hair loss and diabetic symptoms including neither polyuria nor polydipsia nor blurred vision  MUSCULOSKELETAL:  negative for  myalgias, arthralgias, joint swelling, stiff joints and decreased range of motion  NEUROLOGICAL:  negative for memory problems, speech problems, visual disturbance, dysphagia, weakness and numbness      PHYSICAL EXAM:   Constitutional:  Awake, alert cooperative, no apparent distress, and appears stated age. HEENT:  Moist and pink mucous membranes, normocephalic, without obvious abnormality, atraumatic, normal ears and nose. Neck:  Supple, symmetrical, trachea midline, no JVD, no adenopathy, thyroid symmetric, not enlarged and no tenderness, good carotid upstroke bilaterally, no carotid bruit, skin normal  Lungs: No increased work of breathing, good air exchange, clear to auscultation bilaterally, no crackles or wheezing.   Cardiovascular: Normal apical artery disease or future cardiac events. Abnormal study. Angiography: (12/19/2018) 1. 50% disease involving the mid segment of the LAD, 40% involving the proximal segment, mildly ectatic proximal to mid segment of the LAD. Mild disease involving the first diagonal branch. 2. Luminal irregularities involving the left circumflex artery. 3. Luminal irregularities involving a very large dominant right coronary artery.   Cardiology Labs: BMP:    Lab Results   Component Value Date     12/17/2018    K 4.7 12/17/2018     12/17/2018    CO2 26 12/17/2018    BUN 16 12/17/2018    CREATININE 1.1 12/17/2018     CMP:    Lab Results   Component Value Date     12/17/2018    K 4.7 12/17/2018     12/17/2018    CO2 26 12/17/2018    BUN 16 12/17/2018    CREATININE 1.1 12/17/2018    PROT 7.4 10/21/2015     CBC:    Lab Results   Component Value Date    WBC 5.5 12/17/2018    RBC 4.55 12/17/2018    HGB 12.7 12/17/2018    HCT 40.5 12/17/2018    MCV 89.0 12/17/2018    RDW 14.0 12/17/2018     12/17/2018     PT/INR:  No results found for: PTINR  PT/INR Warfarin:  No components found for: PTPATWAR, PTINRWAR  PTT:    Lab Results   Component Value Date    APTT 39.0 12/17/2018     PTT Heparin:  No components found for: APTTHEP  Magnesium:    Lab Results   Component Value Date    MG 2.2 10/20/2015     TSH:    Lab Results   Component Value Date    TSH 4.310 11/27/2016     TROPONIN:  No components found for: TROP  BNP:  No results found for: BNP  FASTING LIPID PANEL:    Lab Results   Component Value Date    CHOL 148 11/28/2016    HDL 54 11/28/2016    TRIG 92 11/28/2016     No orders to display     I have personally reviewed the laboratory, cardiac diagnostic and radiographic testing as outlined above:      IMPRESSION:  1: Chronic atrial fibrillation: Controlled ventricular response, will continue current medications, will continue chronic anticoagulation wit Eliquis        2: CAD: Had cardiac catheterization on (12/19/2018) with the following findings:  #50% disease involving the mid segment of the LAD, 40% involving the proximal segment, mildly ectatic proximal to mid segment of the LAD. Mild disease involving the first diagonal branch. #Luminal irregularities involving the left circumflex artery. #Luminal irregularities involving a very large dominant right coronary artery. Continue current treatment           3: Hypertension: Controlled, continue current treatment. 4:  Cerebral infarction due to unspecified occlusion or stenosis              5: Long term (current) use of anticoagulants                RECOMMENDATIONS:   1. Continue current treatment  2. Increase risk of bleeding due to being on anti-coagulation, symptoms and signs of bleeding discussed with patient, patient was advised to seek medical attention at the earliest symptoms or signs of bleeding. 3.  Follow-up with Dr. Marlene Maravilla as scheduled  4. Follow-up with Dr. Ritu Hernandez in 6 months, sooner if symptomatic for any reason    I have reviewed my findings and recommendations with patient and her daughter at bedside    Electronically signed by Buster Rinne, MD on 4/19/2021 at 12:09 PM    NOTE: This report was transcribed using voice recognition software.  Every effort was made to ensure accuracy; however, inadvertent computerized transcription errors may be present

## 2021-05-20 RX ORDER — APIXABAN 5 MG/1
TABLET, FILM COATED ORAL
Qty: 42 TABLET | Refills: 0 | COMMUNITY
Start: 2021-05-20 | End: 2021-10-14

## 2021-08-19 RX ORDER — CARVEDILOL 12.5 MG/1
TABLET ORAL
Qty: 60 TABLET | Refills: 3 | Status: SHIPPED | OUTPATIENT
Start: 2021-08-19

## 2021-08-20 RX ORDER — SPIRONOLACTONE 50 MG/1
50 TABLET, FILM COATED ORAL DAILY
Qty: 90 TABLET | Refills: 1 | Status: SHIPPED
Start: 2021-08-20 | End: 2022-02-14

## 2021-10-14 RX ORDER — APIXABAN 5 MG/1
TABLET, FILM COATED ORAL
Qty: 180 TABLET | Refills: 1 | Status: SHIPPED
Start: 2021-10-14 | End: 2021-11-11 | Stop reason: SDUPTHER

## 2021-11-11 ENCOUNTER — OFFICE VISIT (OUTPATIENT)
Dept: CARDIOLOGY CLINIC | Age: 86
End: 2021-11-11
Payer: MEDICARE

## 2021-11-11 VITALS
SYSTOLIC BLOOD PRESSURE: 120 MMHG | HEART RATE: 74 BPM | BODY MASS INDEX: 34.2 KG/M2 | HEIGHT: 63 IN | WEIGHT: 193 LBS | DIASTOLIC BLOOD PRESSURE: 72 MMHG

## 2021-11-11 DIAGNOSIS — I48.21 PERMANENT ATRIAL FIBRILLATION (HCC): Primary | ICD-10-CM

## 2021-11-11 PROCEDURE — 4040F PNEUMOC VAC/ADMIN/RCVD: CPT | Performed by: INTERNAL MEDICINE

## 2021-11-11 PROCEDURE — 1123F ACP DISCUSS/DSCN MKR DOCD: CPT | Performed by: INTERNAL MEDICINE

## 2021-11-11 PROCEDURE — 1090F PRES/ABSN URINE INCON ASSESS: CPT | Performed by: INTERNAL MEDICINE

## 2021-11-11 PROCEDURE — 1036F TOBACCO NON-USER: CPT | Performed by: INTERNAL MEDICINE

## 2021-11-11 PROCEDURE — G8484 FLU IMMUNIZE NO ADMIN: HCPCS | Performed by: INTERNAL MEDICINE

## 2021-11-11 PROCEDURE — 93000 ELECTROCARDIOGRAM COMPLETE: CPT | Performed by: INTERNAL MEDICINE

## 2021-11-11 PROCEDURE — 99214 OFFICE O/P EST MOD 30 MIN: CPT | Performed by: INTERNAL MEDICINE

## 2021-11-11 PROCEDURE — G8427 DOCREV CUR MEDS BY ELIG CLIN: HCPCS | Performed by: INTERNAL MEDICINE

## 2021-11-11 PROCEDURE — G8417 CALC BMI ABV UP PARAM F/U: HCPCS | Performed by: INTERNAL MEDICINE

## 2021-11-11 RX ORDER — APIXABAN 5 MG/1
TABLET, FILM COATED ORAL
Qty: 28 TABLET | Refills: 0 | COMMUNITY
Start: 2021-11-11 | End: 2022-06-15 | Stop reason: SDUPTHER

## 2021-11-11 NOTE — PROGRESS NOTES
[facility] Cardiology Progress Note  Dr. Dumont [place] [facility] [place], [place]      Referring Physician: Rodri Villasenor MD  CHIEF COMPLAINT:   Chief Complaint   Patient presents with   [place]  Atrial Fibrillation     6 month. Patient denies any cp sob or dizziness. HISTORY OF PRESENT ILLNESS:   Patient is 80year old lady with CAD, atrial fibrillation, right MCA stroke, is here for follow up . Shortness of breath is at baseline, Patient denies any lightheadedness, no dizziness, no palpitations, no pedal edema, no PND, no orthopnea, no syncope, no presyncopal episodes.     Functional capacity is at baseline      Past Medical History:   Diagnosis Date    SONY (acute kidney injury) ([facility] [address] 11/26/2016    Arthritis     back    Atrial fibrillation (HCC)     Chronic atrial fibrillation (HCC)     CKD (chronic kidney disease) stage 3, GFR 30-59 ml/min ([facility] [address] 11/26/2016    History of blood transfusion     Hypertension     Hypothyroidism, unspecified     Long term (current) use of anticoagulants     Murmur, heart     innocent  no symptoms    PONV (postoperative nausea and vomiting)     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 (HCC)     Thyroid disease          Past Surgical History:   Procedure Laterality Date    APPENDECTOMY      BACK SURGERY      lumbar fusion    CATARACT REMOVAL WITH IMPLANT  09-25-12    right eye    CATARACT REMOVAL WITH IMPLANT Left 09/17/13    CHOLECYSTECTOMY      COLONOSCOPY      FOOT SURGERY      bilateral    HYSTERECTOMY      OTHER SURGICAL HISTORY Right 10/07/2015    [facility]    TOTAL KNEE ARTHROPLASTY Left February 23, 2015         Current Outpatient Medications   Medication Sig Dispense Refill    spironolactone (ALDACTONE) 50 MG tablet TAKE 1 TABLET BY MOUTH DAILY 90 tablet 1    carvedilol (COREG) 12.5 MG tablet TAKE 1/2 OF A TABLET BY MOUTH TWICE DAILY 60 tablet 3    PARoxetine (PAXIL) 10 MG tablet       levothyroxine (SYNTHROID) 88 MCG tablet Take 1 tablet by mouth Daily 30 tablet 3    acetaminophen 650 MG TABS Take 650 mg by mouth every 4 hours as needed for Fever (For mild pain or temperature above 102 degrees Fahrenheit) 120 tablet 3    Carboxymethylcellulose Sodium (REFRESH TEARS) 0.5 % SOLN Apply 1 drop to eye daily as needed (for dry eye).  Multiple Vitamins-Minerals (THERAPEUTIC MULTIVITAMIN-MINERALS) tablet Take 1 tablet by mouth daily.  ELIQUIS 5 MG TABS tablet TAKE 1 TABLET BY MOUTH TWICE DAILY 28 tablet 0     No current facility-administered medications for this visit. Allergies as of 11/11/2021 - Review Complete 04/19/2021   Allergen Reaction Noted    Cephalexin hcl [cephalexin] Other (See Comments) 02/11/2020    Penicillins  06/08/2012    Codeine Itching 06/08/2012    Cytotec [misoprostol]  09/20/2012    Entex [ami-janet]  09/20/2012    Morphine Hives 02/11/2020    Penicillin g  01/27/2015       Social History     Socioeconomic History    Marital status:      Spouse name: Not on file    Number of children: Not on file    Years of education: Not on file    Highest education level: Not on file   Occupational History    Not on file   Tobacco Use    Smoking status: Never Smoker    Smokeless tobacco: Never Used   Vaping Use    Vaping Use: Never used   Substance and Sexual Activity    Alcohol use: Yes     Alcohol/week: 1.0 standard drink     Types: 1 Glasses of wine per week     Comment: twice a week. 2 cup of coffee a day     Drug use: No    Sexual activity: Not Currently   Other Topics Concern    Not on file   Social History Narrative    Not on file     Social Determinants of Health     Financial Resource Strain:     Difficulty of Paying Living Expenses: Not on file   Food Insecurity:     Worried About Running Out of Food in the Last Year: Not on file    Butch of Food in the Last Year: Not on file   Transportation Needs:     Lack of Transportation (Medical): Not on file    Lack of Transportation (Non-Medical):  Not on file   Physical Activity:     Days of Exercise per Week: Not on file    Minutes of Exercise per Session: Not on file   Stress:     Feeling of Stress : Not on file   Social Connections:     Frequency of Communication with Friends and Family: Not on file    Frequency of Social Gatherings with Friends and Family: Not on file    Attends Yazdanism Services: Not on file    Active Member of [address] [place] [address] or Organizations: Not on file    Attends Club or Organization Meetings: Not on file    Marital Status: Not on file   Intimate Partner Violence:     Fear of Current or Ex-Partner: Not on file    Emotionally Abused: Not on file    Physically Abused: Not on file    Sexually Abused: Not on file   Housing Stability:     Unable to Pay for Housing in the Last Year: Not on file    Number of [place] in the Last Year: Not on file    Unstable Housing in the Last Year: Not on file       Family History   Problem Relation Age of Onset    Heart Disease Mother     Arthritis Mother     Heart Disease Father        REVIEW OF SYSTEMS:     CONSTITUTIONAL:  negative for  fevers, chills, sweats and fatigue  HEENT:  negative for  tinnitus, earaches, nasal congestion and epistaxis  RESPIRATORY:  negative for  dry cough, cough with sputum, dyspnea, wheezing and hemoptysis  GASTROINTESTINAL:  negative for nausea, vomiting, diarrhea, constipation, pruritus and jaundice  HEMATOLOGIC/LYMPHATIC:  negative for easy bruising, bleeding, lymphadenopathy and petechiae  ENDOCRINE:  negative for heat intolerance, cold intolerance, tremor, hair loss and diabetic symptoms including neither polyuria nor polydipsia nor blurred vision  MUSCULOSKELETAL:  negative for  myalgias, arthralgias, joint swelling, stiff joints and decreased range of motion  NEUROLOGICAL:  negative for memory problems, speech problems, visual disturbance, dysphagia, weakness and numbness      PHYSICAL EXAM:   Constitutional:  Awake, alert cooperative, no apparent distress, and appears stated age. HEENT:  Moist and pink mucous membranes, normocephalic, without obvious abnormality, atraumatic, normal ears and nose. Neck:  Supple, symmetrical, trachea midline, no JVD, no adenopathy, thyroid symmetric, not enlarged and no tenderness, good carotid upstroke bilaterally, no carotid bruit, skin normal  Lungs: No increased work of breathing, good air exchange, clear to auscultation bilaterally, no crackles or wheezing. Cardiovascular: Normal apical impulse, irregularly irregular, normal S1 and S2, no S3 or S4, 3/6 systolic murmur at the apex, 3/6 systolic murmur at the left lower sternal border, no pedal edema, good carotid upstroke bilaterally, no carotid bruit, no JVD, no abdominal pulsating masses. Abdomen: Soft, nontender, no hepatomegaly, no splenomegaly, bowel sound positive. CHEST:  Expands symmetrically, nontender to palpation. Musculoskeletal:  No clubbing or cyanosis. No redness, warmth, or swelling of the joints. Neurological: Alert, awake, and oriented X3    /72 (Site: Left Upper Arm, Position: Sitting, Cuff Size: Large Adult)   Pulse 74   Ht 5' 3\" (1.6 m)   Wt 193 lb (87.5 kg)   BMI 34.19 kg/m²     DATA:   I personally reviewed the visit EKG with the following interpretation: Atrial fibrillation, controlled ventricular response, low voltage QRS in the precordial leads    EKG -4/19/21 Atrial fibrillation, controlled ventricular response, normal axis, nonspecific T wave changes    ECHO: 11/26/2016) Proximal septal thickening. NOrmal LV segmental wall motion. Estimated left ventricular ejection fraction is 55%. Diastolic function normal as LAESV Index is <29 ml/m2. Normal right ventricular size and function. Physiologic and/or trace mitral regurgitation is present. Trace aortic regurgitation is noted. Physiologic and/or trace tricuspid regurgitation. RVSP is 24 mmHg. Technically fair quality study. Stress Test: 11/9/2018) 1.  Negative Lexiscan stress test for ischemic symptoms or ischemic EKG changes. 2. Abnormal Cardiolite perfusion scan showing intermediate size, moderate in severity reversible defect involving the lateral wall, mild, large reversible defect involving the inferior wall. 3. Normal left ventricular systolic function with normal wall motion. 4. Comparing this study to a study done in 2008, the lateral and inferior wall defect are new findings. 5. The results of the study predict intermediate to high probability for significant coronary artery disease or future cardiac events. Abnormal study. Angiography: (12/19/2018) 1. 50% disease involving the mid segment of the LAD, 40% involving the proximal segment, mildly ectatic proximal to mid segment of the LAD. Mild disease involving the first diagonal branch. 2. Luminal irregularities involving the left circumflex artery. 3. Luminal irregularities involving a very large dominant right coronary artery.   Cardiology Labs: BMP:    Lab Results   Component Value Date     12/17/2018    K 4.7 12/17/2018     12/17/2018    CO2 26 12/17/2018    BUN 16 12/17/2018    CREATININE 1.1 12/17/2018     CMP:    Lab Results   Component Value Date     12/17/2018    K 4.7 12/17/2018     12/17/2018    CO2 26 12/17/2018    BUN 16 12/17/2018    CREATININE 1.1 12/17/2018    PROT 7.4 10/21/2015     CBC:    Lab Results   Component Value Date    WBC 5.5 12/17/2018    RBC 4.55 12/17/2018    HGB 12.7 12/17/2018    HCT 40.5 12/17/2018    MCV 89.0 12/17/2018    RDW 14.0 12/17/2018     12/17/2018     PT/INR:  No results found for: PTINR  PT/INR Warfarin:  No components found for: PTPATWAR, PTINRWAR  PTT:    Lab Results   Component Value Date    APTT 39.0 12/17/2018     PTT Heparin:  No components found for: APTTHEP  Magnesium:    Lab Results   Component Value Date    MG 2.2 10/20/2015     TSH:    Lab Results   Component Value Date    TSH 4.310 11/27/2016     TROPONIN:  No components found for: TROP  BNP:  No results found for: BNP  FASTING LIPID PANEL:    Lab Results   Component Value Date    CHOL 148 11/28/2016    HDL 54 11/28/2016    TRIG 92 11/28/2016     No orders to display     I have personally reviewed the laboratory, cardiac diagnostic and radiographic testing as outlined above:      IMPRESSION:  1: Chronic atrial fibrillation: Controlled ventricular response, will continue current medications, will continue chronic anticoagulation wit Eliquis        2: CAD: Had cardiac catheterization on (12/19/2018) with the following findings:  #50% disease involving the mid segment of the LAD, 40% involving the proximal segment, mildly ectatic proximal to mid segment of the LAD. Mild disease involving the first diagonal branch. #Luminal irregularities involving the left circumflex artery. #Luminal irregularities involving a very large dominant right coronary artery. Continue current treatment           3: Hypertension: Controlled, continue current treatment. 4: History of CVA         5: Long term (current) use of anticoagulants                RECOMMENDATIONS:   1. Continue current treatment  2. Increase risk of bleeding due to being on anti-coagulation, symptoms and signs of bleeding discussed with patient, patient was advised to seek medical attention at the earliest symptoms or signs of bleeding. 3.  Follow-up with Dr. Hernan Palmer as scheduled  4. Follow-up with Dr. Brooks Grant in 6 months, sooner if symptomatic for any reason    I have reviewed my findings and recommendations with patient and her daughter at bedside    Electronically signed by Rica Saunders MD on 12/16/2021 at 7:40 PM    NOTE: This report was transcribed using voice recognition software.  Every effort was made to ensure accuracy; however, inadvertent computerized transcription errors may be present

## 2022-02-14 RX ORDER — SPIRONOLACTONE 50 MG/1
50 TABLET, FILM COATED ORAL DAILY
Qty: 90 TABLET | Refills: 1 | Status: SHIPPED | OUTPATIENT
Start: 2022-02-14

## 2022-04-04 ENCOUNTER — HOSPITAL ENCOUNTER (EMERGENCY)
Age: 87
Discharge: HOME | End: 2022-04-04
Attending: STUDENT IN AN ORGANIZED HEALTH CARE EDUCATION/TRAINING PROGRAM
Payer: MEDICARE

## 2022-04-04 ENCOUNTER — APPOINTMENT (OUTPATIENT)
Dept: GENERAL RADIOLOGY | Age: 87
End: 2022-04-04
Payer: MEDICARE

## 2022-04-04 ENCOUNTER — APPOINTMENT (OUTPATIENT)
Dept: ULTRASOUND IMAGING | Age: 87
End: 2022-04-04
Payer: MEDICARE

## 2022-04-04 ENCOUNTER — APPOINTMENT (OUTPATIENT)
Dept: CT IMAGING | Age: 87
End: 2022-04-04
Payer: MEDICARE

## 2022-04-04 VITALS
HEIGHT: 63 IN | OXYGEN SATURATION: 98 % | WEIGHT: 193 LBS | RESPIRATION RATE: 18 BRPM | BODY MASS INDEX: 34.2 KG/M2 | SYSTOLIC BLOOD PRESSURE: 135 MMHG | DIASTOLIC BLOOD PRESSURE: 79 MMHG | TEMPERATURE: 98 F | HEART RATE: 68 BPM

## 2022-04-04 DIAGNOSIS — S46.912A STRAIN OF LEFT SHOULDER, INITIAL ENCOUNTER: ICD-10-CM

## 2022-04-04 DIAGNOSIS — M25.561 ACUTE PAIN OF RIGHT KNEE: Primary | ICD-10-CM
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
## 2022-04-04 LAB
ANION GAP SERPL CALCULATED.3IONS-SCNC: 12 MMOL/L (ref 7–16)
BACTERIA: ABNORMAL /HPF
BASOPHILS ABSOLUTE: 0.09 E9/L (ref 0–0.2)
BASOPHILS RELATIVE PERCENT: 1.1 % (ref 0–2)
BILIRUBIN URINE: NEGATIVE
BLOOD, URINE: ABNORMAL
BUN BLDV-MCNC: 36 MG/DL (ref 6–23)
CALCIUM SERPL-MCNC: 9.6 MG/DL (ref 8.6–10.2)
CHLORIDE BLD-SCNC: 99 MMOL/L (ref 98–107)
CLARITY: CLEAR
CO2: 22 MMOL/L (ref 22–29)
COLOR: YELLOW
CREAT SERPL-MCNC: 1.3 MG/DL (ref 0.5–1)
EOSINOPHILS ABSOLUTE: 0.09 E9/L (ref 0.05–0.5)
EOSINOPHILS RELATIVE PERCENT: 1.1 % (ref 0–6)
EPITHELIAL CELLS, UA: ABNORMAL /HPF
GFR AFRICAN AMERICAN: 46
GFR NON-AFRICAN AMERICAN: 38 ML/MIN/1.73
GLUCOSE BLD-MCNC: 109 MG/DL (ref 74–99)
GLUCOSE URINE: NEGATIVE MG/DL
HCT VFR BLD CALC: 39.9 % (ref 34–48)
HEMOGLOBIN: 12.7 G/DL (ref 11.5–15.5)
IMMATURE GRANULOCYTES #: 0.03 E9/L
IMMATURE GRANULOCYTES %: 0.4 % (ref 0–5)
KETONES, URINE: NEGATIVE MG/DL
LEUKOCYTE ESTERASE, URINE: NEGATIVE
LYMPHOCYTES ABSOLUTE: 1.02 E9/L (ref 1.5–4)
LYMPHOCYTES RELATIVE PERCENT: 12 % (ref 20–42)
MCH RBC QN AUTO: 28.7 PG (ref 26–35)
MCHC RBC AUTO-ENTMCNC: 31.8 % (ref 32–34.5)
MCV RBC AUTO: 90.1 FL (ref 80–99.9)
MONOCYTES ABSOLUTE: 0.55 E9/L (ref 0.1–0.95)
MONOCYTES RELATIVE PERCENT: 6.4 % (ref 2–12)
NEUTROPHILS ABSOLUTE: 6.75 E9/L (ref 1.8–7.3)
NEUTROPHILS RELATIVE PERCENT: 79 % (ref 43–80)
NITRITE, URINE: NEGATIVE
PDW BLD-RTO: 14 FL (ref 11.5–15)
PH UA: 5.5 (ref 5–9)
PLATELET # BLD: 263 E9/L (ref 130–450)
PMV BLD AUTO: 11.5 FL (ref 7–12)
POTASSIUM REFLEX MAGNESIUM: 5.3 MMOL/L (ref 3.5–5)
PRO-BNP: 903 PG/ML (ref 0–450)
PROTEIN UA: NEGATIVE MG/DL
RBC # BLD: 4.43 E12/L (ref 3.5–5.5)
RBC UA: ABNORMAL /HPF (ref 0–2)
SODIUM BLD-SCNC: 133 MMOL/L (ref 132–146)
SPECIFIC GRAVITY UA: <=1.005 (ref 1–1.03)
TROPONIN, HIGH SENSITIVITY: 35 NG/L (ref 0–9)
UROBILINOGEN, URINE: 0.2 E.U./DL
WBC # BLD: 8.5 E9/L (ref 4.5–11.5)
WBC UA: ABNORMAL /HPF (ref 0–5)

## 2022-04-04 PROCEDURE — 80048 BASIC METABOLIC PNL TOTAL CA: CPT

## 2022-04-04 PROCEDURE — 73562 X-RAY EXAM OF KNEE 3: CPT

## 2022-04-04 PROCEDURE — 76857 US EXAM PELVIC LIMITED: CPT

## 2022-04-04 PROCEDURE — 84484 ASSAY OF TROPONIN QUANT: CPT

## 2022-04-04 PROCEDURE — 81001 URINALYSIS AUTO W/SCOPE: CPT

## 2022-04-04 PROCEDURE — 71250 CT THORAX DX C-: CPT

## 2022-04-04 PROCEDURE — 72125 CT NECK SPINE W/O DYE: CPT

## 2022-04-04 PROCEDURE — 83880 ASSAY OF NATRIURETIC PEPTIDE: CPT

## 2022-04-04 PROCEDURE — 99284 EMERGENCY DEPT VISIT MOD MDM: CPT

## 2022-04-04 PROCEDURE — 93005 ELECTROCARDIOGRAM TRACING: CPT | Performed by: STUDENT IN AN ORGANIZED HEALTH CARE EDUCATION/TRAINING PROGRAM

## 2022-04-04 PROCEDURE — 74176 CT ABD & PELVIS W/O CONTRAST: CPT

## 2022-04-04 PROCEDURE — 85025 COMPLETE CBC W/AUTO DIFF WBC: CPT

## 2022-04-04 PROCEDURE — 70450 CT HEAD/BRAIN W/O DYE: CPT

## 2022-04-04 PROCEDURE — 6370000000 HC RX 637 (ALT 250 FOR IP): Performed by: STUDENT IN AN ORGANIZED HEALTH CARE EDUCATION/TRAINING PROGRAM

## 2022-04-04 RX ORDER — OXYCODONE HYDROCHLORIDE AND ACETAMINOPHEN 5; 325 MG/1; MG/1
1 TABLET ORAL ONCE
Status: COMPLETED | OUTPATIENT
Start: 2022-04-04 | End: 2022-04-04

## 2022-04-04 RX ADMIN — OXYCODONE AND ACETAMINOPHEN 1 TABLET: 5; 325 TABLET ORAL at 19:05

## 2022-04-04 RX ADMIN — OXYCODONE AND ACETAMINOPHEN 1 TABLET: 5; 325 TABLET ORAL at 12:25

## 2022-04-04 ASSESSMENT — PAIN DESCRIPTION - PAIN TYPE: TYPE: ACUTE PAIN

## 2022-04-04 ASSESSMENT — PAIN DESCRIPTION - ORIENTATION: ORIENTATION: LEFT;RIGHT

## 2022-04-04 ASSESSMENT — PAIN DESCRIPTION - LOCATION: LOCATION: ELBOW;SHOULDER;KNEE

## 2022-04-04 ASSESSMENT — PAIN SCALES - GENERAL
PAINLEVEL_OUTOF10: 6
PAINLEVEL_OUTOF10: 3
PAINLEVEL_OUTOF10: 6

## 2022-04-04 ASSESSMENT — PAIN DESCRIPTION - FREQUENCY: FREQUENCY: CONTINUOUS

## 2022-04-04 NOTE — ED PROVIDER NOTES
ED  Provider Note  Admit Date/RoomTime: 4/4/2022 10:36 AM  ED Room: 19/19      History of Present Illness:  4/4/22, Time: 11:07 AM EDT  Chief Complaint   Patient presents with    Fall     in bathroom. Loss of balance. (Uses walker)    Tailbone Pain     landed on trash can    Elbow Pain     left, hit on toilet    Knee Pain     right    Shoulder Pain     left         Eva Kang is a 80 y.o. female presenting to the ED for mechanicall fall around 9 am. Occurred when getting dressed, denies prodrome, fell backwards while standing at sink, was turning to grab walker. Head strike, no LOC. Pain in R forehead, L shoulder acute on chronic, tailbone pain, L elbow, R knee feels like twisted it. Has R artificial knee. Onset: sudden   Timing: persistent    Duration: hours   Associated symptoms: no weakness numbness, no incontinence, No weakness or numbness of lower extremities, no saddle anaesthesia, no bowel or bladder incontinence, no urinary retention. No neck pain  Severity: moderate    Exacerbated by: movement of shoulder   Relieved by: none no meds PTA         Review of Systems:   A complete review of systems was performed and pertinent positives and negatives are stated within HPI, all other systems reviewed and are negative.        --------------------------------------------- PATIENT HISTORY--------------------------------------------  Past Medical History:  has a past medical history of SONY (acute kidney injury) ([facility] [address] Arthritis, Atrial fibrillation ([facility] [address] Chronic atrial fibrillation ([facility] [address] CKD (chronic kidney disease) stage 3, GFR 30-59 ml/min ([address] History of blood transfusion, Hypertension, Hypothyroidism, unspecified, Long term (current) use of anticoagulants, Murmur, heart, PONV (postoperative nausea and vomiting), 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 Past Surgical History:  has a past surgical history that includes Hysterectomy;  Cholecystectomy; Appendectomy; Foot surgery; Cataract removal with implant (09-25-12); Cataract removal with implant (Left, 09/17/13); back surgery; Colonoscopy; Total knee arthroplasty (Left, February 23, 2015); and other surgical history (Right, 10/07/2015). Family History: family history includes Arthritis in her mother; Heart Disease in her father and mother. . Unless otherwise noted, family history is non contributory    Social History:  reports that she has never smoked. She has never used smokeless tobacco. She reports current alcohol use of about 1.0 standard drink of alcohol per week. She reports that she does not use drugs. The patients home medications have been reviewed. Allergies: Cephalexin hcl [cephalexin], Penicillins, Codeine, Cytotec [misoprostol], Entex [ami-janet], Morphine, and Penicillin g    I have reviewed the past medical history, past surgical history, social history, and family history    ---------------------------------------------------PHYSICAL EXAM--------------------------------------    Constitutional/General: Alert and oriented x3  Head: Normocephalic and atraumatic  Eyes: PERRL, EOMI, sclera non icteric  ENT: Oropharynx clear, handling secretions, no trismus  Neck: Supple, full ROM, no stridor, no meningismus  Respiratory: lctab  Cardiovascular: RRR, no R/G/M, 2+ peripheral pulses  Chest: No chest wall tenderness, equal chest rise  Gastrointestinal:  sntnd  Musculoskeletal: Extremities warm and well perfused, moving all extremities  Skin: skin warm and dry. No rashes.    Neurologic: No focal deficits, strength and sensation grossly intact   CN II-XII intact   No facial droop/asymmetry   No dysarthria   No aphasia  No dysmetria on finger-to-nose testing   RUE: 5/5 strength shoulder abduction and abduction, 5/5 elbow flexion and extension, 5/5 wrist flexion and extension, sensation intact  LUE: 5/5 strength shoulder abduction and abduction, 5/5 elbow flexion and extension, 5/5 wrist flexion and extension, sensation intact  RLE: 5 out of 5 strength with straight leg raise, 5 out of 5 strength knee flexion and extension, 5 out of 5 strength ankle dorsiflexion and plantarflexion, sensation intact  LLE: 5 out of 5 strength with straight leg raise, 5 out of 5 strength knee flexion and extension, 5 out of 5 strength ankle dorsiflexion and plantarflexion, sensation intact  Psychiatric: Normal Affect, behavior normal           -------------------------------------------------- RESULTS -------------------------------------------------  I have personally reviewed all laboratory and imaging results for this patient. Results are listed below.      LABS: (Lab results interpreted by me)  Results for orders placed or performed during the hospital encounter of 04/04/22   CBC with Auto Differential   Result Value Ref Range    WBC 8.5 4.5 - 11.5 E9/L    RBC 4.43 3.50 - 5.50 E12/L    Hemoglobin 12.7 11.5 - 15.5 g/dL    Hematocrit 39.9 34.0 - 48.0 %    MCV 90.1 80.0 - 99.9 fL    MCH 28.7 26.0 - 35.0 pg    MCHC 31.8 (L) 32.0 - 34.5 %    RDW 14.0 11.5 - 15.0 fL    Platelets 947 297 - 551 E9/L    MPV 11.5 7.0 - 12.0 fL    Neutrophils % 79.0 43.0 - 80.0 %    Immature Granulocytes % 0.4 0.0 - 5.0 %    Lymphocytes % 12.0 (L) 20.0 - 42.0 %    Monocytes % 6.4 2.0 - 12.0 %    Eosinophils % 1.1 0.0 - 6.0 %    Basophils % 1.1 0.0 - 2.0 %    Neutrophils Absolute 6.75 1.80 - 7.30 E9/L    Immature Granulocytes # 0.03 E9/L    Lymphocytes Absolute 1.02 (L) 1.50 - 4.00 E9/L    Monocytes Absolute 0.55 0.10 - 0.95 E9/L    Eosinophils Absolute 0.09 0.05 - 0.50 E9/L    Basophils Absolute 0.09 0.00 - 0.20 W6/L   Basic Metabolic Panel w/ Reflex to MG   Result Value Ref Range    Sodium 133 132 - 146 mmol/L    Potassium reflex Magnesium 5.3 (H) 3.5 - 5.0 mmol/L    Chloride 99 98 - 107 mmol/L    CO2 22 22 - 29 mmol/L    Anion Gap 12 7 - 16 mmol/L    Glucose 109 (H) 74 - 99 mg/dL    BUN 36 (H) 6 - 23 mg/dL    CREATININE 1.3 (H) 0.5 - 1.0 mg/dL    GFR Non- 38 >=60 mL/min/1.73    GFR African American 46     Calcium 9.6 8.6 - 10.2 mg/dL   Troponin   Result Value Ref Range    Troponin, High Sensitivity 35 (H) 0 - 9 ng/L   Brain Natriuretic Peptide   Result Value Ref Range    Pro- (H) 0 - 450 pg/mL   Urinalysis with Microscopic   Result Value Ref Range    Color, UA Yellow Straw/Yellow    Clarity, UA Clear Clear    Glucose, Ur Negative Negative mg/dL    Bilirubin Urine Negative Negative    Ketones, Urine Negative Negative mg/dL    Specific Gravity, UA <=1.005 1.005 - 1.030    Blood, Urine TRACE (A) Negative    pH, UA 5.5 5.0 - 9.0    Protein, UA Negative Negative mg/dL    Urobilinogen, Urine 0.2 <2.0 E.U./dL    Nitrite, Urine Negative Negative    Leukocyte Esterase, Urine Negative Negative    WBC, UA 1-3 0 - 5 /HPF    RBC, UA 2-5 0 - 2 /HPF    Epithelial Cells, UA RARE /HPF    Bacteria, UA RARE (A) None Seen /HPF   EKG 12 Lead   Result Value Ref Range    Ventricular Rate 59 BPM    Atrial Rate 62 BPM    QRS Duration 86 ms    Q-T Interval 426 ms    QTc Calculation (Bazett) 421 ms    R Axis 1 degrees    T Axis -3 degrees   ,       RADIOLOGY:  Imaging interpreted by Radiologist unless otherwise specified  US PELVIS LIMITED   Final Result   2.0 by 1.9 cm avascular hypoechoic lesion to the left of the midline, likely   complicated cyst.      Status post hysterectomy and BSO. CT Head WO Contrast   Final Result   1. There is no acute intracranial abnormality. Specifically, there is no   intracranial hemorrhage. 2. Atrophy and periventricular leukomalacia,   3. Old infarct within the right basal ganglia. .         CT Cervical Spine WO Contrast   Final Result   1. There is no acute compression fracture or subluxation of the cervical   spine. 2. Advanced multilevel degenerative disc and degenerative joint disease. CT ABDOMEN PELVIS WO CONTRAST Additional Contrast? None   Final Result   1. Rule out prior cholecystectomy.       2. Mild fullness of the right renal pelvis and mild dilatation of the   proximal right ureter, without definite cause. 3.  Left renal cortical scars. 4.  Well-defined, rounded, hypodense structure immediately adjacent to the   left side of the rectum. Question lymph node or complex fluid   collection/seroma. Pelvic ultrasound can be performed for further internal   characterization. 5.  No evidence of intra-abdominal organ injury. CT CHEST WO CONTRAST   Final Result   1. No acute pulmonary pathology. 2.  Postinflammatory changes in the right upper lobe. 3.  Pulmonary parenchymal fibrotic changes/scar formation and mild   atelectasis is seen bilaterally, as described above. 4.  Centrilobular emphysema. 5.  Thin, linear density extending from the takeoff of the right superior   pulmonary artery to the right inferior pulmonary artery. Question retained,   possibly broken catheter. Clinical correlation is recommended. 6.  Other findings, as described above. XR KNEE RIGHT (3 VIEWS)   Final Result   No definite radiographic evidence of acute right knee pathology. Status post right knee arthroplasty. Osteopenia and peripheral arterial disease.             ------------------------- NURSING NOTES AND VITALS REVIEWED ---------------------------  The nursing notes within the ED encounter and vital signs as below have been reviewed by myself  /79   Pulse 68   Temp 98 °F (36.7 °C) (Oral)   Resp 18   Ht 5' 3\" (1.6 m)   Wt 193 lb (87.5 kg)   SpO2 98%   BMI 34.19 kg/m²      The patients available past medical records and past encounters were reviewed.         ------------------------------ ED COURSE/MEDICAL DECISION MAKING----------------------  Medications   oxyCODONE-acetaminophen (PERCOCET) 5-325 MG per tablet 1 tablet (has no administration in time range)   oxyCODONE-acetaminophen (PERCOCET) 5-325 MG per tablet 1 tablet (1 tablet Oral Given 4/4/22 8715)       I, Dr. Rhys Romo, am the primary provider of record    Medical Decision Making:   Mechanical fall. Will perform CT head, C-spine, chest abdomen pelvis for evaluation of traumatic injuries. Screening for alternative etiologies for her fall but she describes a purely mechanical fall. Family member arrives and states that her left foot tends to \"stick\" when she tries to turn, and patient notes that she was trying to turn when this fall occurred, as she was turning, as above, to grab her walker while standing at the sink. Patient is neurovascularly intact, benign neuro exam, and very well-appearing, appears younger than stated age. Pain well controlled with Percocet x1. Patient has a small fluid collection perirectally, suspect posttraumatic seroma, with the mechanism of falling onto a garbage can. Will ultrasound to attempt to further clarify this, and likely discharge to home. As for her possible retained catheter, patient and patient's family member state that she has not ever had a PICC line, no long-term catheter, and besides a heart catheterization, has never had a known catheter placement. Is unclear what this object ultimately his, but does not appear to be causing patient any acute problems, and will follow up as outpatient. US as above. Reassessed patient. Pain well controlled. Will f/u outpatient for cyst.         ED Counseling: This emergency provider has spoken with the patient and any family present to discuss clinical status, results, plan of care, diagnosis and prognosis as able to be determined at this time. Any questions were answered and patient and/or family/POA are agreeable with the plan.       --------------------------------- IMPRESSION AND DISPOSITION ---------------------------------    IMPRESSION  1. Acute pain of right knee    2. Fall, initial encounter    3.  Strain of left shoulder, initial encounter        DISPOSITION  Disposition: Discharge to home  Patient condition is good      This report was transcribed using voice recognition software. Every effort was made to ensure accuracy; however, transcription errors may be present.          Ty Headley MD  04/05/22 [id number]       Ty Headley MD  04/05/22 [id number]

## 2022-04-05 LAB
EKG ATRIAL RATE: 62 BPM
EKG Q-T INTERVAL: 426 MS
EKG QRS DURATION: 86 MS
EKG QTC CALCULATION (BAZETT): 421 MS
EKG R AXIS: 1 DEGREES
EKG T AXIS: -3 DEGREES
EKG VENTRICULAR RATE: 59 BPM

## 2022-04-05 PROCEDURE — 93010 ELECTROCARDIOGRAM REPORT: CPT | Performed by: INTERNAL MEDICINE

## 2022-06-15 RX ORDER — APIXABAN 5 MG/1
TABLET, FILM COATED ORAL
Qty: 56 TABLET | Refills: 0 | COMMUNITY
Start: 2022-06-15

## 2022-07-01 ENCOUNTER — OFFICE VISIT (OUTPATIENT)
Dept: CARDIOLOGY CLINIC | Age: 87
End: 2022-07-01
Payer: MEDICARE

## 2022-07-01 VITALS
HEIGHT: 63 IN | RESPIRATION RATE: 20 BRPM | SYSTOLIC BLOOD PRESSURE: 134 MMHG | BODY MASS INDEX: 33.49 KG/M2 | HEART RATE: 72 BPM | DIASTOLIC BLOOD PRESSURE: 78 MMHG | WEIGHT: 189 LBS

## 2022-07-01 DIAGNOSIS — I48.21 PERMANENT ATRIAL FIBRILLATION (HCC): Primary | ICD-10-CM

## 2022-07-01 PROCEDURE — 1090F PRES/ABSN URINE INCON ASSESS: CPT | Performed by: INTERNAL MEDICINE

## 2022-07-01 PROCEDURE — 93000 ELECTROCARDIOGRAM COMPLETE: CPT | Performed by: INTERNAL MEDICINE

## 2022-07-01 PROCEDURE — 99214 OFFICE O/P EST MOD 30 MIN: CPT | Performed by: INTERNAL MEDICINE

## 2022-07-01 PROCEDURE — 1036F TOBACCO NON-USER: CPT | Performed by: INTERNAL MEDICINE

## 2022-07-01 PROCEDURE — G8417 CALC BMI ABV UP PARAM F/U: HCPCS | Performed by: INTERNAL MEDICINE

## 2022-07-01 PROCEDURE — 1123F ACP DISCUSS/DSCN MKR DOCD: CPT | Performed by: INTERNAL MEDICINE

## 2022-07-01 PROCEDURE — G8427 DOCREV CUR MEDS BY ELIG CLIN: HCPCS | Performed by: INTERNAL MEDICINE

## 2022-07-01 NOTE — PROGRESS NOTES
[facility] Cardiology Progress Note  Dr. Claudia Tovar      Referring Physician: Arina Hannah MD  CHIEF COMPLAINT:   Chief Complaint   Patient presents with    Atrial Fibrillation     8 mo       HISTORY OF PRESENT ILLNESS:   Patient is 80year old lady with CAD, atrial fibrillation, right MCA stroke, is here for follow up . Shortness of breath is at baseline, Patient denies any lightheadedness, no dizziness, no palpitations, no pedal edema, no PND, no orthopnea, no syncope, no presyncopal episodes.   Functional capacity is at baseline      Past Medical History:   Diagnosis Date    SONY (acute kidney injury) ([facility] [address] 11/26/2016    Arthritis     back    Atrial fibrillation (HCC)     Chronic atrial fibrillation (HCC)     CKD (chronic kidney disease) stage 3, GFR 30-59 ml/min ([facility] [address] 11/26/2016    History of blood transfusion     Hypertension     Hypothyroidism, unspecified     Long term (current) use of anticoagulants     Murmur, heart     innocent  no symptoms    PONV (postoperative nausea and vomiting)     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 (HCC)     Thyroid disease          Past Surgical History:   Procedure Laterality Date    APPENDECTOMY      BACK SURGERY      lumbar fusion    CATARACT REMOVAL WITH IMPLANT  09-25-12    right eye    CATARACT REMOVAL WITH IMPLANT Left 09/17/13    CHOLECYSTECTOMY      COLONOSCOPY      FOOT SURGERY      bilateral    HYSTERECTOMY (CERVIX STATUS UNKNOWN)      OTHER SURGICAL HISTORY Right 10[facility]07/2015    [facility]    TOTAL KNEE ARTHROPLASTY Left February 23, 2015         Current Outpatient Medications   Medication Sig Dispense Refill    ELIQUIS 5 MG TABS tablet TAKE 1 TABLET BY MOUTH TWICE DAILY 56 tablet 0    spironolactone (ALDACTONE) 50 MG tablet TAKE 1 TABLET BY MOUTH DAILY (Patient taking differently: Take 25 mg by mouth daily ) 90 tablet 1    carvedilol (COREG) 12.5 MG tablet TAKE 1/2 OF A TABLET BY MOUTH TWICE DAILY 60 tablet 3    levothyroxine Age of Onset    Heart Disease Mother     Arthritis Mother     Heart Disease Father        REVIEW OF SYSTEMS:     CONSTITUTIONAL:  negative for  fevers, chills, sweats and fatigue  HEENT:  negative for  tinnitus, earaches, nasal congestion and epistaxis  RESPIRATORY:  negative for  dry cough, cough with sputum, dyspnea, wheezing and hemoptysis  GASTROINTESTINAL:  negative for nausea, vomiting, diarrhea, constipation, pruritus and jaundice  HEMATOLOGIC/LYMPHATIC:  negative for easy bruising, bleeding, lymphadenopathy and petechiae  ENDOCRINE:  negative for heat intolerance, cold intolerance, tremor, hair loss and diabetic symptoms including neither polyuria nor polydipsia nor blurred vision  MUSCULOSKELETAL:  negative for  myalgias, arthralgias, joint swelling, stiff joints and decreased range of motion  NEUROLOGICAL:  negative for memory problems, speech problems, visual disturbance, dysphagia, weakness and numbness      PHYSICAL EXAM:   Constitutional:  Awake, alert cooperative, no apparent distress, and appears stated age. HEENT:  Moist and pink mucous membranes, normocephalic, without obvious abnormality, atraumatic, normal ears and nose. Neck:  Supple, symmetrical, trachea midline, no JVD, no adenopathy, thyroid symmetric, not enlarged and no tenderness, good carotid upstroke bilaterally, no carotid bruit, skin normal  Lungs: No increased work of breathing, good air exchange, clear to auscultation bilaterally, no crackles or wheezing. Cardiovascular: Normal apical impulse, irregularly irregular, normal S1 and S2, no S3 or S4, 3/6 systolic murmur at the apex, 3/6 systolic murmur at the left lower sternal border, no pedal edema, good carotid upstroke bilaterally, no carotid bruit, no JVD, no abdominal pulsating masses. Abdomen: Soft, nontender, no hepatomegaly, no splenomegaly, bowel sound positive. CHEST:  Expands symmetrically, nontender to palpation. Musculoskeletal:  No clubbing or cyanosis.   No redness, warmth, or swelling of the joints. Neurological: Alert, awake, and oriented X3    /78   Pulse 72   Resp 20   Ht 5' 3\" (1.6 m)   Wt 189 lb (85.7 kg)   BMI 33.48 kg/m²     DATA:   I personally reviewed the visit EKG with the following interpretation: Atrial fibrillation, controlled ventricular response, nonspecific T wave changes in the inferior leads    EKG -4/4/22 Atrial fibrillation with slow ventricular response    ECHO: 11/26/2016) Proximal septal thickening. NOrmal LV segmental wall motion. Estimated left ventricular ejection fraction is 55%. Diastolic function normal as LAESV Index is <29 ml/m2. Normal right ventricular size and function. Physiologic and/or trace mitral regurgitation is present. Trace aortic regurgitation is noted. Physiologic and/or trace tricuspid regurgitation. RVSP is 24 mmHg. Technically fair quality study. Stress Test: 11/9/2018) 1. Negative Lexiscan stress test for ischemic symptoms or ischemic EKG changes. 2. Abnormal Cardiolite perfusion scan showing intermediate size, moderate in severity reversible defect involving the lateral wall, mild, large reversible defect involving the inferior wall. 3. Normal left ventricular systolic function with normal wall motion. 4. Comparing this study to a study done in 2008, the lateral and inferior wall defect are new findings. 5. The results of the study predict intermediate to high probability for significant coronary artery disease or future cardiac events. Abnormal study. Angiography: (12/19/2018) 1. 50% disease involving the mid segment of the LAD, 40% involving the proximal segment, mildly ectatic proximal to mid segment of the LAD. Mild disease involving the first diagonal branch. 2. Luminal irregularities involving the left circumflex artery. 3. Luminal irregularities involving a very large dominant right coronary artery.   Cardiology Labs: BMP:    Lab Results   Component Value Date/Time     04/04/2022 12:10 PM    K 5.3 04/04/2022 12:10 PM    CL 99 04/04/2022 12:10 PM    CO2 22 04/04/2022 12:10 PM    BUN 36 04/04/2022 12:10 PM    CREATININE 1.3 04/04/2022 12:10 PM     CMP:    Lab Results   Component Value Date/Time     04/04/2022 12:10 PM    K 5.3 04/04/2022 12:10 PM    CL 99 04/04/2022 12:10 PM    CO2 22 04/04/2022 12:10 PM    BUN 36 04/04/2022 12:10 PM    CREATININE 1.3 04/04/2022 12:10 PM    PROT 7.4 10/21/2015 07:24 AM     CBC:    Lab Results   Component Value Date/Time    WBC 8.5 04/04/2022 12:10 PM    RBC 4.43 04/04/2022 12:10 PM    HGB 12.7 04/04/2022 12:10 PM    HCT 39.9 04/04/2022 12:10 PM    MCV 90.1 04/04/2022 12:10 PM    RDW 14.0 04/04/2022 12:10 PM     04/04/2022 12:10 PM     PT/INR:  No results found for: PTINR  PT/INR Warfarin:  No components found for: PTPATWAR, PTINRWAR  PTT:    Lab Results   Component Value Date/Time    APTT 39.0 12/17/2018 11:35 AM     PTT Heparin:  No components found for: APTTHEP  Magnesium:    Lab Results   Component Value Date/Time    MG 2.2 10/20/2015 10:25 AM     TSH:    Lab Results   Component Value Date/Time    TSH 4.310 11/27/2016 09:01 AM     TROPONIN:  No components found for: TROP  BNP:  No results found for: BNP  FASTING LIPID PANEL:    Lab Results   Component Value Date/Time    CHOL 148 11/28/2016 02:52 PM    HDL 54 11/28/2016 02:52 PM    TRIG 92 11/28/2016 02:52 PM     No orders to display     I have personally reviewed the laboratory, cardiac diagnostic and radiographic testing as outlined above:      IMPRESSION:  1: Chronic atrial fibrillation: Now in sinus rhythm, will continue current medications, will continue chronic anticoagulation wit Eliquis        2: CAD: Had cardiac catheterization on (12/19/2018) with the following findings:  #50% disease involving the mid segment of the LAD, 40% involving the proximal segment, mildly ectatic proximal to mid segment of the LAD. Mild disease involving the first diagonal branch.   #Luminal irregularities involving the left circumflex artery. #Luminal irregularities involving a very large dominant right coronary artery. Continue current treatment           3: Hypertension: Controlled, continue current treatment. 4: History of CVA         5: Chronic anticoagulation. RECOMMENDATIONS:   1. Continue current treatment  2. Increase risk of bleeding due to being on anti-coagulation, symptoms and signs of bleeding discussed with patient, patient was advised to seek medical attention at the earliest symptoms or signs of bleeding. 3.  Follow-up with Dr. Leyda Saha as scheduled  4. Follow-up with Dr. Jaki Walker in 1 year, sooner if symptomatic for any reason    I have reviewed my findings and recommendations with patient and her daughter at bedside    Electronically signed by Elizabeth Salcedo MD on 8/12/2022 at 6:11 PM    NOTE: This report was transcribed using voice recognition software.  Every effort was made to ensure accuracy; however, inadvertent computerized transcription errors may be present

## 2023-01-17 ENCOUNTER — OFFICE VISIT (OUTPATIENT)
Dept: CARDIOLOGY CLINIC | Age: 88
End: 2023-01-17

## 2023-01-17 VITALS
BODY MASS INDEX: 33.49 KG/M2 | RESPIRATION RATE: 16 BRPM | SYSTOLIC BLOOD PRESSURE: 120 MMHG | DIASTOLIC BLOOD PRESSURE: 62 MMHG | HEART RATE: 75 BPM | HEIGHT: 63 IN | WEIGHT: 189 LBS

## 2023-01-17 DIAGNOSIS — I48.21 PERMANENT ATRIAL FIBRILLATION (HCC): Primary | ICD-10-CM

## 2023-01-17 RX ORDER — ALBUTEROL SULFATE 90 UG/1
2 AEROSOL, METERED RESPIRATORY (INHALATION) EVERY 6 HOURS PRN
COMMUNITY

## 2023-01-17 NOTE — PROGRESS NOTES
[facility] Cardiology Progress Note  Dr. Ammon Garsia      Referring Physician: Tacos Garcias MD  CHIEF COMPLAINT:   Chief Complaint   Patient presents with    Heart Problem     6 month        HISTORY OF PRESENT ILLNESS:   Patient is 80year old lady with CAD, atrial fibrillation, right MCA stroke, is here for follow up . Shortness of breath is at baseline, Patient denies any lightheadedness, no dizziness, no palpitations, no pedal edema, no PND, no orthopnea, no syncope, no presyncopal episodes.   Functional capacity is at baseline      Past Medical History:   Diagnosis Date    SONY (acute kidney injury) ([facility] 11/26/2016    Arthritis     back    Atrial fibrillation (HCC)     Chronic atrial fibrillation (HCC)     CKD (chronic kidney disease) stage 3, GFR 30-59 ml/min ([facility] [address] 11/26/2016    History of blood transfusion     Hypertension     Hypothyroidism, unspecified     Long term (current) use of anticoagulants     Murmur, heart     innocent  no symptoms    PONV (postoperative nausea and vomiting)     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 (HCC)     Thyroid disease          Past Surgical History:   Procedure Laterality Date    APPENDECTOMY      BACK SURGERY      lumbar fusion    CATARACT REMOVAL WITH IMPLANT  09-25-12    right eye    CATARACT REMOVAL WITH IMPLANT Left 09/17/13    CHOLECYSTECTOMY      COLONOSCOPY      FOOT SURGERY      bilateral    HYSTERECTOMY (CERVIX STATUS UNKNOWN)      OTHER SURGICAL HISTORY Right 10/07/2015    rijt total knee arthroplasy    TOTAL KNEE ARTHROPLASTY Left February 23, 2015         Current Outpatient Medications   Medication Sig Dispense Refill    albuterol sulfate HFA (VENTOLIN HFA) 108 (90 Base) MCG/ACT inhaler Inhale 2 puffs into the lungs every 6 hours as needed for Wheezing      ELIQUIS 5 MG TABS tablet TAKE 1 TABLET BY MOUTH TWICE DAILY 56 tablet 0    spironolactone (ALDACTONE) 50 MG tablet TAKE 1 TABLET BY MOUTH DAILY (Patient taking differently: Take 25 mg by mouth daily) 90 tablet 1    carvedilol (COREG) 12.5 MG tablet TAKE 1/2 OF A TABLET BY MOUTH TWICE DAILY 60 tablet 3    levothyroxine (SYNTHROID) 88 MCG tablet Take 1 tablet by mouth Daily 30 tablet 3    acetaminophen 650 MG TABS Take 650 mg by mouth every 4 hours as needed for Fever (For mild pain or temperature above 102 degrees Fahrenheit) 120 tablet 3    carboxymethylcellulose (REFRESH PLUS) 0.5 % SOLN ophthalmic solution Apply 1 drop to eye daily as needed (for dry eye). Multiple Vitamins-Minerals (THERAPEUTIC MULTIVITAMIN-MINERALS) tablet Take 1 tablet by mouth daily. PARoxetine (PAXIL) 10 MG tablet  (Patient not taking: No sig reported)       No current facility-administered medications for this visit. Allergies as of 01/17/2023 - Fully Reviewed 01/17/2023   Allergen Reaction Noted    Cephalexin hcl [cephalexin] Other (See Comments) 02/11/2020    Penicillins  06/08/2012    Codeine Itching 06/08/2012    Cytotec [misoprostol]  09/20/2012    Entex [ami-janet]  09/20/2012    Morphine Hives 02/11/2020    Penicillin g  01/27/2015       Social History     Socioeconomic History    Marital status:      Spouse name: Not on file    Number of children: Not on file    Years of education: Not on file    Highest education level: Not on file   Occupational History    Not on file   Tobacco Use    Smoking status: Never    Smokeless tobacco: Never   Vaping Use    Vaping Use: Never used   Substance and Sexual Activity    Alcohol use: Yes     Alcohol/week: 1.0 standard drink     Types: 1 Glasses of wine per week     Comment: twice a week. 2 cup of coffee a day / wine on a occa.     Drug use: No    Sexual activity: Not Currently   Other Topics Concern    Not on file   Social History Narrative    Not on file     Social Determinants of Health     Financial Resource Strain: Not on file   Food Insecurity: Not on file   Transportation Needs: Not on file   Physical Activity: Not on file   Stress: Not on file   Social Connections: Not on file   Intimate Partner Violence: Not on file   Housing Stability: Not on file       Family History   Problem Relation Age of Onset    Heart Disease Mother     Arthritis Mother     Heart Disease Father        REVIEW OF SYSTEMS:     CONSTITUTIONAL:  negative for  fevers, chills, sweats and fatigue  HEENT:  negative for  tinnitus, earaches, nasal congestion and epistaxis  RESPIRATORY:  negative for  dry cough, cough with sputum, dyspnea, wheezing and hemoptysis  GASTROINTESTINAL:  negative for nausea, vomiting, diarrhea, constipation, pruritus and jaundice  HEMATOLOGIC/LYMPHATIC:  negative for easy bruising, bleeding, lymphadenopathy and petechiae  ENDOCRINE:  negative for heat intolerance, cold intolerance, tremor, hair loss and diabetic symptoms including neither polyuria nor polydipsia nor blurred vision  MUSCULOSKELETAL:  negative for  myalgias, arthralgias, joint swelling, stiff joints and decreased range of motion  NEUROLOGICAL:  negative for memory problems, speech problems, visual disturbance, dysphagia, weakness and numbness      PHYSICAL EXAM:   Constitutional:  Awake, alert cooperative, no apparent distress, and appears stated age. HEENT:  Moist and pink mucous membranes, normocephalic, without obvious abnormality, atraumatic, normal ears and nose. Neck:  Supple, symmetrical, trachea midline, no JVD, no adenopathy, thyroid symmetric, not enlarged and no tenderness, good carotid upstroke bilaterally, no carotid bruit, skin normal  Lungs: No increased work of breathing, good air exchange, clear to auscultation bilaterally, no crackles or wheezing. Cardiovascular: Normal apical impulse, irregularly irregular, normal S1 and S2, no S3 or S4, 3/6 systolic murmur at the apex, 3/6 systolic murmur at the left lower sternal border, no pedal edema, good carotid upstroke bilaterally, no carotid bruit, no JVD, no abdominal pulsating masses.   Abdomen: Soft, nontender, no hepatomegaly, no splenomegaly, bowel sound positive. CHEST:  Expands symmetrically, nontender to palpation. Musculoskeletal:  No clubbing or cyanosis. No redness, warmth, or swelling of the joints. Neurological: Alert, awake, and oriented X3    /62   Pulse 75   Resp 16   Ht 5' 3\" (1.6 m)   Wt 189 lb (85.7 kg)   BMI 33.48 kg/m²     DATA:   I personally reviewed the visit EKG with the following interpretation: Atrial fibrillation, controlled ventricular response, occasional PVCs versus aberrantly conducted beats, early transition. EKG 7/1/2022, atrial fibrillation, controlled ventricular response, nonspecific T wave changes in the inferior leads    EKG -4/4/22 Atrial fibrillation with slow ventricular response    ECHO: 11/26/2016) Proximal septal thickening. NOrmal LV segmental wall motion. Estimated left ventricular ejection fraction is 55%. Diastolic function normal as LAESV Index is <29 ml/m2. Normal right ventricular size and function. Physiologic and/or trace mitral regurgitation is present. Trace aortic regurgitation is noted. Physiologic and/or trace tricuspid regurgitation. RVSP is 24 mmHg. Technically fair quality study. Stress Test: 11/9/2018) 1. Negative Lexiscan stress test for ischemic symptoms or ischemic EKG changes. 2. Abnormal Cardiolite perfusion scan showing intermediate size, moderate in severity reversible defect involving the lateral wall, mild, large reversible defect involving the inferior wall. 3. Normal left ventricular systolic function with normal wall motion. 4. Comparing this study to a study done in 2008, the lateral and inferior wall defect are new findings. 5. The results of the study predict intermediate to high probability for significant coronary artery disease or future cardiac events. Abnormal study.     Angiography: (12/19/2018) 1. 50% disease involving the mid segment of the LAD, 40% involving the proximal segment, mildly ectatic proximal to mid segment of the LAD. Mild disease involving the first diagonal branch. 2. Luminal irregularities involving the left circumflex artery. 3. Luminal irregularities involving a very large dominant right coronary artery.   Cardiology Labs: BMP:    Lab Results   Component Value Date/Time     04/04/2022 12:10 PM    K 5.3 04/04/2022 12:10 PM    CL 99 04/04/2022 12:10 PM    CO2 22 04/04/2022 12:10 PM    BUN 36 04/04/2022 12:10 PM    CREATININE 1.3 04/04/2022 12:10 PM     CMP:    Lab Results   Component Value Date/Time     04/04/2022 12:10 PM    K 5.3 04/04/2022 12:10 PM    CL 99 04/04/2022 12:10 PM    CO2 22 04/04/2022 12:10 PM    BUN 36 04/04/2022 12:10 PM    CREATININE 1.3 04/04/2022 12:10 PM    PROT 7.4 10/21/2015 07:24 AM     CBC:    Lab Results   Component Value Date/Time    WBC 8.5 04/04/2022 12:10 PM    RBC 4.43 04/04/2022 12:10 PM    HGB 12.7 04/04/2022 12:10 PM    HCT 39.9 04/04/2022 12:10 PM    MCV 90.1 04/04/2022 12:10 PM    RDW 14.0 04/04/2022 12:10 PM     04/04/2022 12:10 PM     PT/INR:  No results found for: PTINR  PT/INR Warfarin:  No components found for: PTPATWAR, PTINRWAR  PTT:    Lab Results   Component Value Date/Time    APTT 39.0 12/17/2018 11:35 AM     PTT Heparin:  No components found for: APTTHEP  Magnesium:    Lab Results   Component Value Date/Time    MG 2.2 10/20/2015 10:25 AM     TSH:    Lab Results   Component Value Date/Time    TSH 4.310 11/27/2016 09:01 AM     TROPONIN:  No components found for: TROP  BNP:  No results found for: BNP  FASTING LIPID PANEL:    Lab Results   Component Value Date/Time    CHOL 148 11/28/2016 02:52 PM    HDL 54 11/28/2016 02:52 PM    TRIG 92 11/28/2016 02:52 PM     No orders to display     I have personally reviewed the laboratory, cardiac diagnostic and radiographic testing as outlined above:      IMPRESSION:  1: Chronic atrial fibrillation: Now in sinus rhythm, will continue current medications, will continue chronic anticoagulation wit Eliquis        2: CAD: Had cardiac catheterization on (12/19/2018) with the following findings:  #50% disease involving the mid segment of the LAD, 40% involving the proximal segment, mildly ectatic proximal to mid segment of the LAD. Mild disease involving the first diagonal branch. #Luminal irregularities involving the left circumflex artery. #Luminal irregularities involving a very large dominant right coronary artery. Continue current treatment           3: Hypertension: Controlled, continue current treatment. 4: History of CVA         5: Chronic anticoagulation. RECOMMENDATIONS:   1. Continue current treatment  2. Increase risk of bleeding due to being on anti-coagulation, symptoms and signs of bleeding discussed with patient, patient was advised to seek medical attention at the earliest symptoms or signs of bleeding. 3.  Follow-up with Dr. Anitha Rush as scheduled  4. Follow-up with Dr. Tammy Coronado in 1 year, sooner if symptomatic for any reason    I have reviewed my findings and recommendations with patient and her daughter at bedside    Electronically signed by Estephania Schwarz MD on 1/17/2023 at 11:47 AM    NOTE: This report was transcribed using voice recognition software.  Every effort was made to ensure accuracy; however, inadvertent computerized transcription errors may be present

## 2023-05-01 RX ORDER — SPIRONOLACTONE 50 MG/1
50 TABLET, FILM COATED ORAL DAILY
Qty: 90 TABLET | Refills: 1 | Status: SHIPPED | OUTPATIENT
Start: 2023-05-01

## 2023-06-02 ENCOUNTER — HOSPITAL ENCOUNTER (EMERGENCY)
Age: 88
Discharge: HOME | End: 2023-06-02
Payer: MEDICARE

## 2023-06-02 VITALS
TEMPERATURE: 98.6 F | DIASTOLIC BLOOD PRESSURE: 77 MMHG | HEART RATE: 87 BPM | OXYGEN SATURATION: 100 % | HEIGHT: 62 IN | BODY MASS INDEX: 34.41 KG/M2 | SYSTOLIC BLOOD PRESSURE: 121 MMHG | WEIGHT: 187 LBS | RESPIRATION RATE: 18 BRPM

## 2023-06-02 DIAGNOSIS — R10.9 FLANK PAIN: ICD-10-CM

## 2023-06-02 DIAGNOSIS — N39.0 URINARY TRACT INFECTION WITHOUT HEMATURIA, SITE UNSPECIFIED: Primary | ICD-10-CM

## 2023-06-02 LAB
BACTERIA URNS QL MICRO: ABNORMAL /HPF
BILIRUB UR QL STRIP: NEGATIVE
CLARITY UR: CLEAR
COLOR UR: YELLOW
EPI CELLS #/AREA URNS HPF: ABNORMAL /HPF
GLUCOSE UR STRIP-MCNC: NEGATIVE MG/DL
HGB UR QL STRIP: NEGATIVE
KETONES UR STRIP-MCNC: NEGATIVE MG/DL
LEUKOCYTE ESTERASE UR QL STRIP: ABNORMAL
NITRITE UR QL STRIP: NEGATIVE
PH UR STRIP: 5.5 [PH] (ref 5–9)
PROT UR STRIP-MCNC: NEGATIVE MG/DL
RBC #/AREA URNS HPF: ABNORMAL /HPF (ref 0–2)
SP GR UR STRIP: 1.01 (ref 1–1.03)
UROBILINOGEN UR STRIP-ACNC: 0.2 E.U./DL
WBC #/AREA URNS HPF: ABNORMAL /HPF (ref 0–5)

## 2023-06-02 PROCEDURE — 87088 URINE BACTERIA CULTURE: CPT

## 2023-06-02 PROCEDURE — 81001 URINALYSIS AUTO W/SCOPE: CPT

## 2023-06-02 PROCEDURE — 99211 OFF/OP EST MAY X REQ PHY/QHP: CPT

## 2023-06-02 RX ORDER — METHYLPREDNISOLONE 4 MG/1
TABLET ORAL
Qty: 21 TABLET | Refills: 0 | Status: SHIPPED | OUTPATIENT
Start: 2023-06-02

## 2023-06-02 RX ORDER — CIPROFLOXACIN 250 MG/1
250 TABLET, FILM COATED ORAL 2 TIMES DAILY
Qty: 10 TABLET | Refills: 0 | Status: SHIPPED | OUTPATIENT
Start: 2023-06-02 | End: 2023-06-07

## 2023-06-02 ASSESSMENT — PAIN DESCRIPTION - DESCRIPTORS: DESCRIPTORS: ACHING

## 2023-06-02 ASSESSMENT — PAIN DESCRIPTION - LOCATION: LOCATION: BACK

## 2023-06-02 ASSESSMENT — PAIN - FUNCTIONAL ASSESSMENT: PAIN_FUNCTIONAL_ASSESSMENT: 0-10

## 2023-06-02 ASSESSMENT — PAIN SCALES - GENERAL: PAINLEVEL_OUTOF10: 9

## 2023-06-02 ASSESSMENT — PAIN DESCRIPTION - ORIENTATION: ORIENTATION: LOWER

## 2023-06-02 ASSESSMENT — PAIN DESCRIPTION - FREQUENCY: FREQUENCY: CONTINUOUS

## 2023-06-02 NOTE — ED PROVIDER NOTES
TABLET    Take 1 tablet by mouth 2 times daily for 5 days    METHYLPREDNISOLONE (MEDROL, DEREK,) 4 MG TABLET    Take as directed on package insert days 1-6       DISCONTINUED MEDICATIONS:  Discontinued Medications    No medications on file              (Please note that portions of this note were completed with a voice recognition program.  Efforts were made to edit the dictations but occasionally words are mis-transcribed.)    EMILIO Loo CNP (electronically signed)           EMILIO Loo CNP  06/02/23 [id number]

## 2023-06-04 ENCOUNTER — HOSPITAL ENCOUNTER (EMERGENCY)
Age: 88
Discharge: HOME | End: 2023-06-04
Payer: MEDICARE

## 2023-06-04 VITALS
BODY MASS INDEX: 34.2 KG/M2 | WEIGHT: 187 LBS | SYSTOLIC BLOOD PRESSURE: 160 MMHG | HEART RATE: 70 BPM | RESPIRATION RATE: 20 BRPM | DIASTOLIC BLOOD PRESSURE: 84 MMHG | TEMPERATURE: 98 F | OXYGEN SATURATION: 97 %

## 2023-06-04 DIAGNOSIS — B02.9 HERPES ZOSTER WITHOUT COMPLICATION: Primary | ICD-10-CM

## 2023-06-04 DIAGNOSIS — M79.2 NEURALGIA: ICD-10-CM

## 2023-06-04 LAB — BACTERIA UR CULT: NORMAL

## 2023-06-04 PROCEDURE — 99283 EMERGENCY DEPT VISIT LOW MDM: CPT

## 2023-06-04 RX ORDER — ACYCLOVIR 800 MG/1
800 TABLET ORAL 3 TIMES DAILY
Qty: 21 TABLET | Refills: 0 | Status: SHIPPED | OUTPATIENT
Start: 2023-06-04 | End: 2023-06-11

## 2023-06-04 RX ORDER — TRAMADOL HYDROCHLORIDE 50 MG/1
50 TABLET ORAL EVERY 6 HOURS PRN
Qty: 12 TABLET | Refills: 0 | Status: CANCELLED | OUTPATIENT
Start: 2023-06-04 | End: 2023-06-07

## 2023-06-04 RX ORDER — ACYCLOVIR 800 MG/1
800 TABLET ORAL
Qty: 35 TABLET | Refills: 0 | Status: CANCELLED | OUTPATIENT
Start: 2023-06-04 | End: 2023-06-11

## 2023-06-04 RX ORDER — OXYCODONE HYDROCHLORIDE AND ACETAMINOPHEN 5; 325 MG/1; MG/1
1 TABLET ORAL EVERY 6 HOURS PRN
Qty: 12 TABLET | Refills: 0 | Status: SHIPPED | OUTPATIENT
Start: 2023-06-04 | End: 2023-06-07

## 2023-06-20 ENCOUNTER — APPOINTMENT (OUTPATIENT)
Dept: CT IMAGING | Age: 88
End: 2023-06-20
Payer: MEDICARE

## 2023-06-20 ENCOUNTER — HOSPITAL ENCOUNTER (EMERGENCY)
Age: 88
Discharge: HOME | End: 2023-06-20
Attending: STUDENT IN AN ORGANIZED HEALTH CARE EDUCATION/TRAINING PROGRAM
Payer: MEDICARE

## 2023-06-20 VITALS
DIASTOLIC BLOOD PRESSURE: 65 MMHG | OXYGEN SATURATION: 97 % | RESPIRATION RATE: 20 BRPM | SYSTOLIC BLOOD PRESSURE: 121 MMHG | HEART RATE: 61 BPM | BODY MASS INDEX: 34.2 KG/M2 | WEIGHT: 187 LBS | TEMPERATURE: 97.4 F

## 2023-06-20 DIAGNOSIS — R19.00 INTRAABDOMINAL MASS: Primary | ICD-10-CM

## 2023-06-20 LAB
ALBUMIN SERPL-MCNC: 3.5 G/DL (ref 3.5–5.2)
ALP SERPL-CCNC: 120 U/L (ref 35–104)
ALT SERPL-CCNC: 7 U/L (ref 0–32)
ANION GAP SERPL CALCULATED.3IONS-SCNC: 10 MMOL/L (ref 7–16)
AST SERPL-CCNC: 25 U/L (ref 0–31)
BACTERIA URNS QL MICRO: ABNORMAL /HPF
BASOPHILS # BLD: 0.07 E9/L (ref 0–0.2)
BASOPHILS NFR BLD: 0.6 % (ref 0–2)
BILIRUB DIRECT SERPL-MCNC: <0.2 MG/DL (ref 0–0.3)
BILIRUB INDIRECT SERPL-MCNC: ABNORMAL MG/DL (ref 0–1)
BILIRUB SERPL-MCNC: 0.3 MG/DL (ref 0–1.2)
BILIRUB UR QL STRIP: NEGATIVE
BUN SERPL-MCNC: 27 MG/DL (ref 6–23)
CALCIUM SERPL-MCNC: 9.2 MG/DL (ref 8.6–10.2)
CANCER AG125 SERPL-ACNC: 704.5 U/ML (ref 0–35)
CEA SERPL-MCNC: 0.4 NG/ML (ref 0–5.2)
CHLORIDE SERPL-SCNC: 94 MMOL/L (ref 98–107)
CLARITY UR: CLEAR
CO2 SERPL-SCNC: 23 MMOL/L (ref 22–29)
COLOR UR: YELLOW
CREAT SERPL-MCNC: 1.2 MG/DL (ref 0.5–1)
EKG ATRIAL RATE: 90 BPM
EKG Q-T INTERVAL: 398 MS
EKG QRS DURATION: 76 MS
EKG QTC CALCULATION (BAZETT): 403 MS
EKG R AXIS: -5 DEGREES
EKG T AXIS: 1 DEGREES
EKG VENTRICULAR RATE: 62 BPM
EOSINOPHIL # BLD: 0.18 E9/L (ref 0.05–0.5)
EOSINOPHIL NFR BLD: 1.6 % (ref 0–6)
EPI CELLS #/AREA URNS HPF: ABNORMAL /HPF
ERYTHROCYTE [DISTWIDTH] IN BLOOD BY AUTOMATED COUNT: 14.5 FL (ref 11.5–15)
GLUCOSE SERPL-MCNC: 137 MG/DL (ref 74–99)
GLUCOSE UR STRIP-MCNC: NEGATIVE MG/DL
HCT VFR BLD AUTO: 33.1 % (ref 34–48)
HGB BLD-MCNC: 10.5 G/DL (ref 11.5–15.5)
HGB UR QL STRIP: NEGATIVE
IMM GRANULOCYTES # BLD: 0.05 E9/L
IMM GRANULOCYTES NFR BLD: 0.5 % (ref 0–5)
KETONES UR STRIP-MCNC: NEGATIVE MG/DL
LACTATE BLDV-SCNC: 1.8 MMOL/L (ref 0.5–2.2)
LEUKOCYTE ESTERASE UR QL STRIP: NEGATIVE
LIPASE: 22 U/L (ref 13–60)
LYMPHOCYTES # BLD: 0.79 E9/L (ref 1.5–4)
LYMPHOCYTES NFR BLD: 7.2 % (ref 20–42)
MCH RBC QN AUTO: 26.9 PG (ref 26–35)
MCHC RBC AUTO-ENTMCNC: 31.7 % (ref 32–34.5)
MCV RBC AUTO: 84.9 FL (ref 80–99.9)
MONOCYTES # BLD: 0.83 E9/L (ref 0.1–0.95)
MONOCYTES NFR BLD: 7.6 % (ref 2–12)
NEUTROPHILS # BLD: 9.01 E9/L (ref 1.8–7.3)
NEUTS SEG NFR BLD: 82.5 % (ref 43–80)
NITRITE UR QL STRIP: NEGATIVE
PH UR STRIP: 5.5 [PH] (ref 5–9)
PLATELET # BLD AUTO: 355 E9/L (ref 130–450)
PMV BLD AUTO: 10.7 FL (ref 7–12)
POTASSIUM SERPL-SCNC: 4.9 MMOL/L (ref 3.5–5)
PROT SERPL-MCNC: 6.7 G/DL (ref 6.4–8.3)
PROT UR STRIP-MCNC: NEGATIVE MG/DL
RBC # BLD AUTO: 3.9 E12/L (ref 3.5–5.5)
RBC #/AREA URNS HPF: ABNORMAL /HPF (ref 0–2)
SODIUM SERPL-SCNC: 127 MMOL/L (ref 132–146)
SP GR UR STRIP: <=1.005 (ref 1–1.03)
TROPONIN, HIGH SENSITIVITY: 38 NG/L (ref 0–9)
UROBILINOGEN UR STRIP-ACNC: 0.2 E.U./DL
WBC # BLD: 10.9 E9/L (ref 4.5–11.5)
WBC #/AREA URNS HPF: ABNORMAL /HPF (ref 0–5)

## 2023-06-20 PROCEDURE — 83605 ASSAY OF LACTIC ACID: CPT

## 2023-06-20 PROCEDURE — 36415 COLL VENOUS BLD VENIPUNCTURE: CPT

## 2023-06-20 PROCEDURE — 96361 HYDRATE IV INFUSION ADD-ON: CPT

## 2023-06-20 PROCEDURE — 6360000004 HC RX CONTRAST MEDICATION: Performed by: RADIOLOGY

## 2023-06-20 PROCEDURE — 81001 URINALYSIS AUTO W/SCOPE: CPT

## 2023-06-20 PROCEDURE — 80076 HEPATIC FUNCTION PANEL: CPT

## 2023-06-20 PROCEDURE — 96374 THER/PROPH/DIAG INJ IV PUSH: CPT

## 2023-06-20 PROCEDURE — 74177 CT ABD & PELVIS W/CONTRAST: CPT

## 2023-06-20 PROCEDURE — 2500000003 HC RX 250 WO HCPCS: Performed by: STUDENT IN AN ORGANIZED HEALTH CARE EDUCATION/TRAINING PROGRAM

## 2023-06-20 PROCEDURE — A4216 STERILE WATER/SALINE, 10 ML: HCPCS | Performed by: STUDENT IN AN ORGANIZED HEALTH CARE EDUCATION/TRAINING PROGRAM

## 2023-06-20 PROCEDURE — 82378 CARCINOEMBRYONIC ANTIGEN: CPT

## 2023-06-20 PROCEDURE — 83690 ASSAY OF LIPASE: CPT

## 2023-06-20 PROCEDURE — 99285 EMERGENCY DEPT VISIT HI MDM: CPT

## 2023-06-20 PROCEDURE — 2580000003 HC RX 258: Performed by: STUDENT IN AN ORGANIZED HEALTH CARE EDUCATION/TRAINING PROGRAM

## 2023-06-20 PROCEDURE — 6360000002 HC RX W HCPCS: Performed by: STUDENT IN AN ORGANIZED HEALTH CARE EDUCATION/TRAINING PROGRAM

## 2023-06-20 PROCEDURE — 96375 TX/PRO/DX INJ NEW DRUG ADDON: CPT

## 2023-06-20 PROCEDURE — 80048 BASIC METABOLIC PNL TOTAL CA: CPT

## 2023-06-20 PROCEDURE — 87088 URINE BACTERIA CULTURE: CPT

## 2023-06-20 PROCEDURE — 93005 ELECTROCARDIOGRAM TRACING: CPT | Performed by: STUDENT IN AN ORGANIZED HEALTH CARE EDUCATION/TRAINING PROGRAM

## 2023-06-20 PROCEDURE — 84484 ASSAY OF TROPONIN QUANT: CPT

## 2023-06-20 PROCEDURE — 86304 IMMUNOASSAY TUMOR CA 125: CPT

## 2023-06-20 PROCEDURE — 93010 ELECTROCARDIOGRAM REPORT: CPT | Performed by: INTERNAL MEDICINE

## 2023-06-20 PROCEDURE — 85025 COMPLETE CBC W/AUTO DIFF WBC: CPT

## 2023-06-20 RX ORDER — 0.9 % SODIUM CHLORIDE 0.9 %
1000 INTRAVENOUS SOLUTION INTRAVENOUS ONCE
Status: COMPLETED | OUTPATIENT
Start: 2023-06-20 | End: 2023-06-20

## 2023-06-20 RX ORDER — ONDANSETRON 2 MG/ML
4 INJECTION INTRAMUSCULAR; INTRAVENOUS ONCE
Status: COMPLETED | OUTPATIENT
Start: 2023-06-20 | End: 2023-06-20

## 2023-06-20 RX ADMIN — IOPAMIDOL 75 ML: 755 INJECTION, SOLUTION INTRAVENOUS at 13:26

## 2023-06-20 RX ADMIN — ONDANSETRON 4 MG: 2 INJECTION INTRAMUSCULAR; INTRAVENOUS at 12:28

## 2023-06-20 RX ADMIN — FAMOTIDINE 20 MG: 10 INJECTION, SOLUTION INTRAVENOUS at 12:28

## 2023-06-20 RX ADMIN — SODIUM CHLORIDE 1000 ML: 9 INJECTION, SOLUTION INTRAVENOUS at 12:27

## 2023-06-20 ASSESSMENT — PAIN - FUNCTIONAL ASSESSMENT: PAIN_FUNCTIONAL_ASSESSMENT: NONE - DENIES PAIN

## 2023-06-20 NOTE — DISCHARGE INSTRUCTIONS
Go to Dr. Earline Aguilera at 11 am on Friday ([phone])    CT ABDOMEN PELVIS W IV CONTRAST Additional Contrast? None (Final result)  Result time 06/20/23 14:07:51  Final result by Tiffanie Valdes MD (06/20/23 14:07:51)                Impression:    1. 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 The lesion abuts the   transverse colon causing apparent wall edema in the right and transverse   colon. No high-grade obstruction detected. Differential considerations   include mesenteric metastasis including ovarian or colon carcinoma, carcinoid   tumor, mesenteric desmoid tumor, or lymphoma. 2.  There is an irregular mass in the left pelvis on axial image 105   measuring 3.8 cm in diameter extending into the left iliac region. 2.  Trace free pelvic fluid.

## 2023-06-20 NOTE — ED PROVIDER NOTES
outpatient. Patient and son agreeable to plan and stated they would rather follow-up to be transferred. Since the patient is not obstructed and pain is well controlled at this time plan will be for patient to see specialist in the office on Friday at 11 AM.  He already made her an appointment. She was given a disc with her CT scan.  and CEA markers were sent and pending. Family agreeable to plan and strict return precautions were given. Patient was discharged in stable condition. CONSULTS: (Who and What was discussed)  IP CONSULT TO [address] [place] [address] with Dr. Lisette Allen (Surgery). Discussed case. They state to transfer to [facility]. Spoke with Dr. Jelly Glover (gyn/onc). Discussed case. They will see this patient in follow-up. I am the Primary Clinician of Record. FINAL IMPRESSION      1.  Intraabdominal mass          DISPOSITION/PLAN     DISPOSITION Decision To Discharge 06/20/2023 04:21:52 PM      PATIENT REFERRED TO:  Alex Mendosa MD  [address] [place] [address] [id number]    Schedule an appointment as soon as possible for a visit       Raquel Lozada MD  [address] [id number] [phone]    Go in 3 days  11 AM      DISCHARGE MEDICATIONS:  New Prescriptions    No medications on file       DISCONTINUED MEDICATIONS:  Discontinued Medications    No medications on file              (Please note that portions of this note were completed with a voice recognition program.  Efforts were made to edit the dictations but occasionally words are mis-transcribed.)    Idania Miguel DO (electronically signed)           Idania Miguel DO  06/20/23 [id number]

## 2023-06-20 NOTE — ED NOTES
Radiology Procedure Waiver   Name: Stephanie Wilhelm  : 1931  MRN: [id number]    Date:  23    Time: 1:00 PM EDT    Benefits of immediately proceeding with radiology exam(s) without pre-testing outweigh the risks or are not indicated as specified below and therefore the following is/are being waived:    [x] Benefits of immediate radiology exam(s) outweigh any risk. OR    Pre-exam testing is not indicated for the following reason(s):  [] Pregnancy test   [] Patients LMP on-time and regular.   [] Patient had Tubal Ligation or has other Contraception Device. [] Patient  is Menopausal or Premenarcheal.    [] Patient had Full or Partial Hysterectomy. [] Protocol for CT contrast allegry   [] Patient has tolerated well previously   [] Patient does not have a true allergy    [] MRI Questionnaire     [x] BUN/Creatinine   [] Patient age w/no hx of renal dysfunction. [] Patient on Dialysis. [] Recent Normal Labs.   Electronically signed by Idania Miguel DO on 23 at 1:00 PM EDT               Idania Miguel DO  23 [phone]

## 2023-06-23 LAB — BACTERIA UR CULT: NORMAL

## 2023-06-26 ENCOUNTER — APPOINTMENT (OUTPATIENT)
Dept: GENERAL RADIOLOGY | Age: 88
DRG: 987 | End: 2023-06-26
Payer: MEDICARE

## 2023-06-26 ENCOUNTER — APPOINTMENT (OUTPATIENT)
Dept: CT IMAGING | Age: 88
DRG: 987 | End: 2023-06-26
Payer: MEDICARE

## 2023-06-26 ENCOUNTER — HOSPITAL ENCOUNTER (INPATIENT)
Age: 88
LOS: 3 days | Discharge: HOME | DRG: 987 | End: 2023-06-29
Attending: EMERGENCY MEDICINE | Admitting: INTERNAL MEDICINE
Payer: MEDICARE

## 2023-06-26 DIAGNOSIS — R41.82 ALTERED MENTAL STATUS, UNSPECIFIED ALTERED MENTAL STATUS TYPE: Primary | ICD-10-CM

## 2023-06-26 PROBLEM — G93.41 ACUTE METABOLIC ENCEPHALOPATHY: Status: ACTIVE | Noted: 2023-06-26

## 2023-06-26 LAB
ALBUMIN SERPL-MCNC: 3.5 G/DL (ref 3.5–5.2)
ALP SERPL-CCNC: 139 U/L (ref 35–104)
ALT SERPL-CCNC: 7 U/L (ref 0–32)
ANION GAP SERPL CALCULATED.3IONS-SCNC: 12 MMOL/L (ref 7–16)
AST SERPL-CCNC: 20 U/L (ref 0–31)
BASOPHILS # BLD: 0.05 E9/L (ref 0–0.2)
BASOPHILS NFR BLD: 0.6 % (ref 0–2)
BILIRUB SERPL-MCNC: 0.3 MG/DL (ref 0–1.2)
BUN SERPL-MCNC: 27 MG/DL (ref 6–23)
CALCIUM SERPL-MCNC: 9.2 MG/DL (ref 8.6–10.2)
CHLORIDE SERPL-SCNC: 94 MMOL/L (ref 98–107)
CHP ED QC CHECK: YES
CO2 SERPL-SCNC: 21 MMOL/L (ref 22–29)
CREAT SERPL-MCNC: 1.3 MG/DL (ref 0.5–1)
EKG ATRIAL RATE: 416 BPM
EKG Q-T INTERVAL: 406 MS
EKG QRS DURATION: 80 MS
EKG QTC CALCULATION (BAZETT): 422 MS
EKG R AXIS: 6 DEGREES
EKG T AXIS: -15 DEGREES
EKG VENTRICULAR RATE: 65 BPM
EOSINOPHIL # BLD: 0.04 E9/L (ref 0.05–0.5)
EOSINOPHIL NFR BLD: 0.5 % (ref 0–6)
ERYTHROCYTE [DISTWIDTH] IN BLOOD BY AUTOMATED COUNT: 14.5 FL (ref 11.5–15)
GLUCOSE BLD-MCNC: 104 MG/DL
GLUCOSE SERPL-MCNC: 123 MG/DL (ref 74–99)
HCT VFR BLD AUTO: 32.2 % (ref 34–48)
HGB BLD-MCNC: 10.5 G/DL (ref 11.5–15.5)
IMM GRANULOCYTES # BLD: 0.05 E9/L
IMM GRANULOCYTES NFR BLD: 0.6 % (ref 0–5)
LACTATE BLDV-SCNC: 1.2 MMOL/L (ref 0.5–2.2)
LYMPHOCYTES # BLD: 0.86 E9/L (ref 1.5–4)
LYMPHOCYTES NFR BLD: 10.7 % (ref 20–42)
MCH RBC QN AUTO: 27.6 PG (ref 26–35)
MCHC RBC AUTO-ENTMCNC: 32.6 % (ref 32–34.5)
MCV RBC AUTO: 84.5 FL (ref 80–99.9)
METER GLUCOSE: 104 MG/DL (ref 74–99)
MONOCYTES # BLD: 0.81 E9/L (ref 0.1–0.95)
MONOCYTES NFR BLD: 10.1 % (ref 2–12)
NEUTROPHILS # BLD: 6.22 E9/L (ref 1.8–7.3)
NEUTS SEG NFR BLD: 77.5 % (ref 43–80)
PLATELET # BLD AUTO: 340 E9/L (ref 130–450)
PMV BLD AUTO: 11 FL (ref 7–12)
POTASSIUM SERPL-SCNC: 4.9 MMOL/L (ref 3.5–5)
PROT SERPL-MCNC: 6.8 G/DL (ref 6.4–8.3)
RBC # BLD AUTO: 3.81 E12/L (ref 3.5–5.5)
SODIUM SERPL-SCNC: 127 MMOL/L (ref 132–146)
TSH SERPL-MCNC: 3.49 UIU/ML (ref 0.27–4.2)
WBC # BLD: 8 E9/L (ref 4.5–11.5)

## 2023-06-26 PROCEDURE — 70450 CT HEAD/BRAIN W/O DYE: CPT

## 2023-06-26 PROCEDURE — 36415 COLL VENOUS BLD VENIPUNCTURE: CPT

## 2023-06-26 PROCEDURE — 80053 COMPREHEN METABOLIC PANEL: CPT

## 2023-06-26 PROCEDURE — 96361 HYDRATE IV INFUSION ADD-ON: CPT

## 2023-06-26 PROCEDURE — 1200000000 HC SEMI PRIVATE

## 2023-06-26 PROCEDURE — 84443 ASSAY THYROID STIM HORMONE: CPT

## 2023-06-26 PROCEDURE — 2580000003 HC RX 258: Performed by: INTERNAL MEDICINE

## 2023-06-26 PROCEDURE — 83605 ASSAY OF LACTIC ACID: CPT

## 2023-06-26 PROCEDURE — 85025 COMPLETE CBC W/AUTO DIFF WBC: CPT

## 2023-06-26 PROCEDURE — 6360000002 HC RX W HCPCS

## 2023-06-26 PROCEDURE — 71045 X-RAY EXAM CHEST 1 VIEW: CPT

## 2023-06-26 PROCEDURE — 87040 BLOOD CULTURE FOR BACTERIA: CPT

## 2023-06-26 PROCEDURE — 0202U NFCT DS 22 TRGT SARS-COV-2: CPT

## 2023-06-26 PROCEDURE — 87324 CLOSTRIDIUM AG IA: CPT

## 2023-06-26 PROCEDURE — 96374 THER/PROPH/DIAG INJ IV PUSH: CPT

## 2023-06-26 PROCEDURE — 93010 ELECTROCARDIOGRAM REPORT: CPT | Performed by: INTERNAL MEDICINE

## 2023-06-26 PROCEDURE — 99285 EMERGENCY DEPT VISIT HI MDM: CPT

## 2023-06-26 PROCEDURE — 2580000003 HC RX 258

## 2023-06-26 PROCEDURE — 82962 GLUCOSE BLOOD TEST: CPT

## 2023-06-26 PROCEDURE — 93005 ELECTROCARDIOGRAM TRACING: CPT

## 2023-06-26 PROCEDURE — 87449 NOS EACH ORGANISM AG IA: CPT

## 2023-06-26 RX ORDER — LOPERAMIDE HYDROCHLORIDE 2 MG/1
2 CAPSULE ORAL EVERY 6 HOURS PRN
COMMUNITY

## 2023-06-26 RX ORDER — SODIUM CHLORIDE 9 MG/ML
INJECTION, SOLUTION INTRAVENOUS CONTINUOUS
Status: DISCONTINUED | OUTPATIENT
Start: 2023-06-26 | End: 2023-06-29 | Stop reason: HOSPADM

## 2023-06-26 RX ORDER — SPIRONOLACTONE 25 MG/1
12.5 TABLET ORAL DAILY
Status: ON HOLD | COMMUNITY
End: 2023-06-29 | Stop reason: HOSPADM

## 2023-06-26 RX ORDER — PANTOPRAZOLE SODIUM 40 MG/1
40 TABLET, DELAYED RELEASE ORAL
COMMUNITY

## 2023-06-26 RX ORDER — SODIUM CHLORIDE AND POTASSIUM CHLORIDE 150; 900 MG/100ML; MG/100ML
INJECTION, SOLUTION INTRAVENOUS CONTINUOUS
Status: DISCONTINUED | OUTPATIENT
Start: 2023-06-26 | End: 2023-06-26

## 2023-06-26 RX ORDER — ACETAMINOPHEN 325 MG/1
650 TABLET ORAL EVERY 4 HOURS PRN
COMMUNITY

## 2023-06-26 RX ORDER — NALOXONE HYDROCHLORIDE 0.4 MG/ML
0.2 INJECTION, SOLUTION INTRAMUSCULAR; INTRAVENOUS; SUBCUTANEOUS ONCE
Status: COMPLETED | OUTPATIENT
Start: 2023-06-26 | End: 2023-06-26

## 2023-06-26 RX ORDER — ALUMINA, MAGNESIA, AND SIMETHICONE 2400; 2400; 240 MG/30ML; MG/30ML; MG/30ML
30 SUSPENSION ORAL EVERY 6 HOURS PRN
Status: ON HOLD | COMMUNITY
End: 2023-06-29 | Stop reason: HOSPADM

## 2023-06-26 RX ORDER — SENNA AND DOCUSATE SODIUM 50; 8.6 MG/1; MG/1
1 TABLET, FILM COATED ORAL NIGHTLY
Status: ON HOLD | COMMUNITY
End: 2023-06-29 | Stop reason: HOSPADM

## 2023-06-26 RX ORDER — LEVOTHYROXINE SODIUM 88 UG/1
88 TABLET ORAL
COMMUNITY

## 2023-06-26 RX ORDER — 0.9 % SODIUM CHLORIDE 0.9 %
1000 INTRAVENOUS SOLUTION INTRAVENOUS ONCE
Status: COMPLETED | OUTPATIENT
Start: 2023-06-26 | End: 2023-06-26

## 2023-06-26 RX ADMIN — NALOXONE HYDROCHLORIDE 0.2 MG: 0.4 INJECTION, SOLUTION INTRAMUSCULAR; INTRAVENOUS; SUBCUTANEOUS at 10:44

## 2023-06-26 RX ADMIN — SODIUM CHLORIDE 1000 ML: 9 INJECTION, SOLUTION INTRAVENOUS at 13:45

## 2023-06-26 RX ADMIN — SODIUM CHLORIDE: 9 INJECTION, SOLUTION INTRAVENOUS at 21:47

## 2023-06-26 ASSESSMENT — PAIN SCALES - GENERAL: PAINLEVEL_OUTOF10: 0

## 2023-06-27 ENCOUNTER — APPOINTMENT (OUTPATIENT)
Dept: CT IMAGING | Age: 88
DRG: 987 | End: 2023-06-27
Payer: MEDICARE

## 2023-06-27 LAB
ANION GAP SERPL CALCULATED.3IONS-SCNC: 12 MMOL/L (ref 7–16)
B PARAP IS1001 DNA NPH QL NAA+NON-PROBE: NOT DETECTED
B PERT.PT PRMT NPH QL NAA+NON-PROBE: NOT DETECTED
BASOPHILS # BLD: 0.07 E9/L (ref 0–0.2)
BASOPHILS NFR BLD: 1 % (ref 0–2)
BUN SERPL-MCNC: 21 MG/DL (ref 6–23)
C DIFF TOXIN/ANTIGEN: NORMAL
C PNEUM DNA NPH QL NAA+NON-PROBE: NOT DETECTED
CALCIUM SERPL-MCNC: 8.7 MG/DL (ref 8.6–10.2)
CHLORIDE SERPL-SCNC: 99 MMOL/L (ref 98–107)
CO2 SERPL-SCNC: 17 MMOL/L (ref 22–29)
CREAT SERPL-MCNC: 1.1 MG/DL (ref 0.5–1)
EOSINOPHIL # BLD: 0.1 E9/L (ref 0.05–0.5)
EOSINOPHIL NFR BLD: 1.5 % (ref 0–6)
ERYTHROCYTE [DISTWIDTH] IN BLOOD BY AUTOMATED COUNT: 14.6 FL (ref 11.5–15)
FLUAV RNA NPH QL NAA+NON-PROBE: NOT DETECTED
FLUBV RNA NPH QL NAA+NON-PROBE: NOT DETECTED
GLUCOSE SERPL-MCNC: 91 MG/DL (ref 74–99)
HADV DNA NPH QL NAA+NON-PROBE: NOT DETECTED
HCOV 229E RNA NPH QL NAA+NON-PROBE: NOT DETECTED
HCOV HKU1 RNA NPH QL NAA+NON-PROBE: NOT DETECTED
HCOV NL63 RNA NPH QL NAA+NON-PROBE: NOT DETECTED
HCOV OC43 RNA NPH QL NAA+NON-PROBE: NOT DETECTED
HCT VFR BLD AUTO: 32.7 % (ref 34–48)
HGB BLD-MCNC: 10.4 G/DL (ref 11.5–15.5)
HMPV RNA NPH QL NAA+NON-PROBE: NOT DETECTED
HPIV1 RNA NPH QL NAA+NON-PROBE: NOT DETECTED
HPIV2 RNA NPH QL NAA+NON-PROBE: NOT DETECTED
HPIV3 RNA NPH QL NAA+NON-PROBE: NOT DETECTED
HPIV4 RNA NPH QL NAA+NON-PROBE: NOT DETECTED
IMM GRANULOCYTES # BLD: 0.04 E9/L
IMM GRANULOCYTES NFR BLD: 0.6 % (ref 0–5)
LYMPHOCYTES # BLD: 0.71 E9/L (ref 1.5–4)
LYMPHOCYTES NFR BLD: 10.3 % (ref 20–42)
M PNEUMO DNA NPH QL NAA+NON-PROBE: NOT DETECTED
MCH RBC QN AUTO: 27.4 PG (ref 26–35)
MCHC RBC AUTO-ENTMCNC: 31.8 % (ref 32–34.5)
MCV RBC AUTO: 86.1 FL (ref 80–99.9)
MONOCYTES # BLD: 0.67 E9/L (ref 0.1–0.95)
MONOCYTES NFR BLD: 9.7 % (ref 2–12)
NEUTROPHILS # BLD: 5.29 E9/L (ref 1.8–7.3)
NEUTS SEG NFR BLD: 76.9 % (ref 43–80)
PLATELET # BLD AUTO: 333 E9/L (ref 130–450)
PMV BLD AUTO: 10.6 FL (ref 7–12)
POTASSIUM SERPL-SCNC: 4.9 MMOL/L (ref 3.5–5)
RBC # BLD AUTO: 3.8 E12/L (ref 3.5–5.5)
RSV RNA NPH QL NAA+NON-PROBE: NOT DETECTED
RV+EV RNA NPH QL NAA+NON-PROBE: NOT DETECTED
SARS-COV-2 RNA NPH QL NAA+NON-PROBE: NOT DETECTED
SODIUM SERPL-SCNC: 128 MMOL/L (ref 132–146)
WBC # BLD: 6.9 E9/L (ref 4.5–11.5)

## 2023-06-27 PROCEDURE — 36415 COLL VENOUS BLD VENIPUNCTURE: CPT

## 2023-06-27 PROCEDURE — 74177 CT ABD & PELVIS W/CONTRAST: CPT

## 2023-06-27 PROCEDURE — 97530 THERAPEUTIC ACTIVITIES: CPT | Performed by: PHYSICAL THERAPIST

## 2023-06-27 PROCEDURE — 85025 COMPLETE CBC W/AUTO DIFF WBC: CPT

## 2023-06-27 PROCEDURE — 80048 BASIC METABOLIC PNL TOTAL CA: CPT

## 2023-06-27 PROCEDURE — 97161 PT EVAL LOW COMPLEX 20 MIN: CPT | Performed by: PHYSICAL THERAPIST

## 2023-06-27 PROCEDURE — 2580000003 HC RX 258: Performed by: INTERNAL MEDICINE

## 2023-06-27 PROCEDURE — 71260 CT THORAX DX C+: CPT

## 2023-06-27 PROCEDURE — 6360000004 HC RX CONTRAST MEDICATION: Performed by: RADIOLOGY

## 2023-06-27 PROCEDURE — 1200000000 HC SEMI PRIVATE

## 2023-06-27 PROCEDURE — 99223 1ST HOSP IP/OBS HIGH 75: CPT | Performed by: NURSE PRACTITIONER

## 2023-06-27 PROCEDURE — 97110 THERAPEUTIC EXERCISES: CPT | Performed by: PHYSICAL THERAPIST

## 2023-06-27 PROCEDURE — 6360000002 HC RX W HCPCS: Performed by: INTERNAL MEDICINE

## 2023-06-27 RX ORDER — PANTOPRAZOLE SODIUM 40 MG/1
40 TABLET, DELAYED RELEASE ORAL
Status: DISCONTINUED | OUTPATIENT
Start: 2023-06-28 | End: 2023-06-29 | Stop reason: HOSPADM

## 2023-06-27 RX ORDER — POTASSIUM CHLORIDE 20 MEQ/1
40 TABLET, EXTENDED RELEASE ORAL PRN
Status: DISCONTINUED | OUTPATIENT
Start: 2023-06-27 | End: 2023-06-29 | Stop reason: HOSPADM

## 2023-06-27 RX ORDER — LEVOTHYROXINE SODIUM 88 UG/1
88 TABLET ORAL
Status: DISCONTINUED | OUTPATIENT
Start: 2023-06-28 | End: 2023-06-29 | Stop reason: HOSPADM

## 2023-06-27 RX ORDER — POTASSIUM CHLORIDE 7.45 MG/ML
10 INJECTION INTRAVENOUS PRN
Status: DISCONTINUED | OUTPATIENT
Start: 2023-06-27 | End: 2023-06-29 | Stop reason: HOSPADM

## 2023-06-27 RX ORDER — ENOXAPARIN SODIUM 100 MG/ML
30 INJECTION SUBCUTANEOUS DAILY
Status: DISCONTINUED | OUTPATIENT
Start: 2023-06-27 | End: 2023-06-27 | Stop reason: DRUGHIGH

## 2023-06-27 RX ORDER — MAGNESIUM SULFATE IN WATER 40 MG/ML
2000 INJECTION, SOLUTION INTRAVENOUS PRN
Status: DISCONTINUED | OUTPATIENT
Start: 2023-06-27 | End: 2023-06-29 | Stop reason: HOSPADM

## 2023-06-27 RX ORDER — ENOXAPARIN SODIUM 100 MG/ML
40 INJECTION SUBCUTANEOUS DAILY
Status: DISCONTINUED | OUTPATIENT
Start: 2023-06-28 | End: 2023-06-29 | Stop reason: HOSPADM

## 2023-06-27 RX ORDER — LOPERAMIDE HYDROCHLORIDE 2 MG/1
2 CAPSULE ORAL 4 TIMES DAILY PRN
Status: DISCONTINUED | OUTPATIENT
Start: 2023-06-27 | End: 2023-06-29 | Stop reason: HOSPADM

## 2023-06-27 RX ADMIN — IOPAMIDOL 75 ML: 755 INJECTION, SOLUTION INTRAVENOUS at 16:22

## 2023-06-27 RX ADMIN — ENOXAPARIN SODIUM 30 MG: 100 INJECTION SUBCUTANEOUS at 13:33

## 2023-06-27 RX ADMIN — SODIUM CHLORIDE: 9 INJECTION, SOLUTION INTRAVENOUS at 10:32

## 2023-06-27 RX ADMIN — SODIUM CHLORIDE: 9 INJECTION, SOLUTION INTRAVENOUS at 17:11

## 2023-06-27 RX ADMIN — IOPAMIDOL 18 ML: 755 INJECTION, SOLUTION INTRAVENOUS at 16:22

## 2023-06-27 ASSESSMENT — PAIN SCALES - GENERAL: PAINLEVEL_OUTOF10: 0

## 2023-06-28 ENCOUNTER — APPOINTMENT (OUTPATIENT)
Dept: INTERVENTIONAL RADIOLOGY/VASCULAR | Age: 88
DRG: 987 | End: 2023-06-28
Payer: MEDICARE

## 2023-06-28 LAB
ANION GAP SERPL CALCULATED.3IONS-SCNC: 14 MMOL/L (ref 7–16)
BASOPHILS # BLD: 0.06 E9/L (ref 0–0.2)
BASOPHILS NFR BLD: 0.8 % (ref 0–2)
BUN SERPL-MCNC: 16 MG/DL (ref 6–23)
CALCIUM SERPL-MCNC: 8.9 MG/DL (ref 8.6–10.2)
CANCER AG125 SERPL-ACNC: 707.1 U/ML (ref 0–35)
CEA SERPL-MCNC: 1.6 NG/ML (ref 0–5.2)
CHLORIDE SERPL-SCNC: 100 MMOL/L (ref 98–107)
CO2 SERPL-SCNC: 16 MMOL/L (ref 22–29)
CREAT SERPL-MCNC: 1 MG/DL (ref 0.5–1)
EOSINOPHIL # BLD: 0.11 E9/L (ref 0.05–0.5)
EOSINOPHIL NFR BLD: 1.5 % (ref 0–6)
ERYTHROCYTE [DISTWIDTH] IN BLOOD BY AUTOMATED COUNT: 14.6 FL (ref 11.5–15)
GLUCOSE SERPL-MCNC: 67 MG/DL (ref 74–99)
HCT VFR BLD AUTO: 32.3 % (ref 34–48)
HGB BLD-MCNC: 10.1 G/DL (ref 11.5–15.5)
IMM GRANULOCYTES # BLD: 0.05 E9/L
IMM GRANULOCYTES NFR BLD: 0.7 % (ref 0–5)
INR BLD: 1.2
LYMPHOCYTES # BLD: 0.74 E9/L (ref 1.5–4)
LYMPHOCYTES NFR BLD: 9.8 % (ref 20–42)
MAGNESIUM SERPL-MCNC: 1.8 MG/DL (ref 1.6–2.6)
MCH RBC QN AUTO: 27 PG (ref 26–35)
MCHC RBC AUTO-ENTMCNC: 31.3 % (ref 32–34.5)
MCV RBC AUTO: 86.4 FL (ref 80–99.9)
MONOCYTES # BLD: 0.76 E9/L (ref 0.1–0.95)
MONOCYTES NFR BLD: 10.1 % (ref 2–12)
NEUTROPHILS # BLD: 5.8 E9/L (ref 1.8–7.3)
NEUTS SEG NFR BLD: 77.1 % (ref 43–80)
PHOSPHATE SERPL-MCNC: 3.2 MG/DL (ref 2.5–4.5)
PLATELET # BLD AUTO: 351 E9/L (ref 130–450)
PMV BLD AUTO: 10.8 FL (ref 7–12)
POTASSIUM SERPL-SCNC: 4.4 MMOL/L (ref 3.5–5)
PROTHROMBIN TIME: 13.7 SEC (ref 9.3–12.4)
RBC # BLD AUTO: 3.74 E12/L (ref 3.5–5.5)
SODIUM SERPL-SCNC: 130 MMOL/L (ref 132–146)
WBC # BLD: 7.5 E9/L (ref 4.5–11.5)

## 2023-06-28 PROCEDURE — 99231 SBSQ HOSP IP/OBS SF/LOW 25: CPT | Performed by: NURSE PRACTITIONER

## 2023-06-28 PROCEDURE — 80048 BASIC METABOLIC PNL TOTAL CA: CPT

## 2023-06-28 PROCEDURE — 0WBF3ZX EXCISION OF ABDOMINAL WALL, PERCUTANEOUS APPROACH, DIAGNOSTIC: ICD-10-PCS | Performed by: INTERNAL MEDICINE

## 2023-06-28 PROCEDURE — 49180 BIOPSY ABDOMINAL MASS: CPT

## 2023-06-28 PROCEDURE — 88305 TISSUE EXAM BY PATHOLOGIST: CPT

## 2023-06-28 PROCEDURE — 1200000000 HC SEMI PRIVATE

## 2023-06-28 PROCEDURE — 2580000003 HC RX 258: Performed by: INTERNAL MEDICINE

## 2023-06-28 PROCEDURE — 97530 THERAPEUTIC ACTIVITIES: CPT

## 2023-06-28 PROCEDURE — 97110 THERAPEUTIC EXERCISES: CPT

## 2023-06-28 PROCEDURE — 83735 ASSAY OF MAGNESIUM: CPT

## 2023-06-28 PROCEDURE — 82378 CARCINOEMBRYONIC ANTIGEN: CPT

## 2023-06-28 PROCEDURE — 88341 IMHCHEM/IMCYTCHM EA ADD ANTB: CPT

## 2023-06-28 PROCEDURE — 2709999900 IR BIOPSY ABDOMINAL/RETROPERITONEAL MASS PERCUTANEOUS

## 2023-06-28 PROCEDURE — 85025 COMPLETE CBC W/AUTO DIFF WBC: CPT

## 2023-06-28 PROCEDURE — 36415 COLL VENOUS BLD VENIPUNCTURE: CPT

## 2023-06-28 PROCEDURE — 77012 CT SCAN FOR NEEDLE BIOPSY: CPT

## 2023-06-28 PROCEDURE — 86304 IMMUNOASSAY TUMOR CA 125: CPT

## 2023-06-28 PROCEDURE — 84100 ASSAY OF PHOSPHORUS: CPT

## 2023-06-28 PROCEDURE — 85610 PROTHROMBIN TIME: CPT

## 2023-06-28 PROCEDURE — 86316 IMMUNOASSAY TUMOR OTHER: CPT

## 2023-06-28 PROCEDURE — 88342 IMHCHEM/IMCYTCHM 1ST ANTB: CPT

## 2023-06-28 RX ADMIN — SODIUM CHLORIDE: 9 INJECTION, SOLUTION INTRAVENOUS at 00:41

## 2023-06-29 VITALS
SYSTOLIC BLOOD PRESSURE: 122 MMHG | DIASTOLIC BLOOD PRESSURE: 70 MMHG | WEIGHT: 175 LBS | HEART RATE: 73 BPM | HEIGHT: 62 IN | OXYGEN SATURATION: 95 % | TEMPERATURE: 97.9 F | RESPIRATION RATE: 16 BRPM | BODY MASS INDEX: 32.2 KG/M2

## 2023-06-29 LAB
ANION GAP SERPL CALCULATED.3IONS-SCNC: 14 MMOL/L (ref 7–16)
BASOPHILS # BLD: 0.07 E9/L (ref 0–0.2)
BASOPHILS NFR BLD: 0.9 % (ref 0–2)
BUN SERPL-MCNC: 12 MG/DL (ref 6–23)
CALCIUM SERPL-MCNC: 9.1 MG/DL (ref 8.6–10.2)
CHLORIDE SERPL-SCNC: 101 MMOL/L (ref 98–107)
CO2 SERPL-SCNC: 15 MMOL/L (ref 22–29)
CREAT SERPL-MCNC: 0.9 MG/DL (ref 0.5–1)
EOSINOPHIL # BLD: 0.14 E9/L (ref 0.05–0.5)
EOSINOPHIL NFR BLD: 1.7 % (ref 0–6)
ERYTHROCYTE [DISTWIDTH] IN BLOOD BY AUTOMATED COUNT: 14.7 FL (ref 11.5–15)
GLUCOSE SERPL-MCNC: 94 MG/DL (ref 74–99)
HCT VFR BLD AUTO: 32 % (ref 34–48)
HGB BLD-MCNC: 10.2 G/DL (ref 11.5–15.5)
IMM GRANULOCYTES # BLD: 0.05 E9/L
IMM GRANULOCYTES NFR BLD: 0.6 % (ref 0–5)
LYMPHOCYTES # BLD: 0.75 E9/L (ref 1.5–4)
LYMPHOCYTES NFR BLD: 9.2 % (ref 20–42)
MCH RBC QN AUTO: 27 PG (ref 26–35)
MCHC RBC AUTO-ENTMCNC: 31.9 % (ref 32–34.5)
MCV RBC AUTO: 84.7 FL (ref 80–99.9)
MONOCYTES # BLD: 0.89 E9/L (ref 0.1–0.95)
MONOCYTES NFR BLD: 11 % (ref 2–12)
NEUTROPHILS # BLD: 6.22 E9/L (ref 1.8–7.3)
NEUTS SEG NFR BLD: 76.6 % (ref 43–80)
PLATELET # BLD AUTO: 366 E9/L (ref 130–450)
PMV BLD AUTO: 10.7 FL (ref 7–12)
POTASSIUM SERPL-SCNC: 4 MMOL/L (ref 3.5–5)
RBC # BLD AUTO: 3.78 E12/L (ref 3.5–5.5)
SODIUM SERPL-SCNC: 130 MMOL/L (ref 132–146)
WBC # BLD: 8.1 E9/L (ref 4.5–11.5)

## 2023-06-29 PROCEDURE — 80048 BASIC METABOLIC PNL TOTAL CA: CPT

## 2023-06-29 PROCEDURE — 85025 COMPLETE CBC W/AUTO DIFF WBC: CPT

## 2023-06-29 PROCEDURE — 2580000003 HC RX 258: Performed by: INTERNAL MEDICINE

## 2023-06-29 PROCEDURE — 97165 OT EVAL LOW COMPLEX 30 MIN: CPT

## 2023-06-29 PROCEDURE — 97535 SELF CARE MNGMENT TRAINING: CPT

## 2023-06-29 PROCEDURE — 6370000000 HC RX 637 (ALT 250 FOR IP): Performed by: INTERNAL MEDICINE

## 2023-06-29 PROCEDURE — 36415 COLL VENOUS BLD VENIPUNCTURE: CPT

## 2023-06-29 RX ADMIN — SODIUM CHLORIDE: 9 INJECTION, SOLUTION INTRAVENOUS at 02:28

## 2023-06-29 RX ADMIN — PANTOPRAZOLE SODIUM 40 MG: 40 TABLET, DELAYED RELEASE ORAL at 05:37

## 2023-06-29 RX ADMIN — LEVOTHYROXINE SODIUM 88 MCG: 0.09 TABLET ORAL at 05:37

## 2023-06-30 LAB — CGA SERPL-MCNC: 208 NG/ML (ref 0–103)

## 2023-07-01 LAB
BACTERIA BLD CULT ORG #2: NORMAL
BACTERIA BLD CULT: NORMAL